# Patient Record
Sex: FEMALE | Race: WHITE | Employment: FULL TIME | URBAN - METROPOLITAN AREA
[De-identification: names, ages, dates, MRNs, and addresses within clinical notes are randomized per-mention and may not be internally consistent; named-entity substitution may affect disease eponyms.]

---

## 2017-04-28 PROCEDURE — 80074 ACUTE HEPATITIS PANEL: CPT | Performed by: FAMILY MEDICINE

## 2017-06-21 PROCEDURE — 88305 TISSUE EXAM BY PATHOLOGIST: CPT | Performed by: INTERNAL MEDICINE

## 2017-10-24 ENCOUNTER — OFFICE VISIT (OUTPATIENT)
Dept: FAMILY MEDICINE CLINIC | Facility: CLINIC | Age: 42
End: 2017-10-24

## 2017-10-24 VITALS
SYSTOLIC BLOOD PRESSURE: 126 MMHG | OXYGEN SATURATION: 99 % | TEMPERATURE: 98 F | WEIGHT: 202.19 LBS | DIASTOLIC BLOOD PRESSURE: 80 MMHG | HEART RATE: 78 BPM | BODY MASS INDEX: 33.69 KG/M2 | HEIGHT: 65 IN | RESPIRATION RATE: 18 BRPM

## 2017-10-24 DIAGNOSIS — J00 ACUTE NASOPHARYNGITIS (COMMON COLD): ICD-10-CM

## 2017-10-24 DIAGNOSIS — R05.9 COUGH: Primary | ICD-10-CM

## 2017-10-24 PROCEDURE — 99213 OFFICE O/P EST LOW 20 MIN: CPT | Performed by: NURSE PRACTITIONER

## 2017-10-24 RX ORDER — BENZONATATE 200 MG/1
200 CAPSULE ORAL 3 TIMES DAILY PRN
Qty: 20 CAPSULE | Refills: 0 | Status: SHIPPED | OUTPATIENT
Start: 2017-10-24 | End: 2017-10-31

## 2017-10-24 RX ORDER — CODEINE PHOSPHATE AND GUAIFENESIN 10; 100 MG/5ML; MG/5ML
5 SOLUTION ORAL EVERY 6 HOURS PRN
Qty: 118 ML | Refills: 0 | Status: SHIPPED | OUTPATIENT
Start: 2017-10-24 | End: 2017-12-16

## 2017-10-24 NOTE — PROGRESS NOTES
CHIEF COMPLAINT:   Patient presents with:  Cough: cough, sore throat, both ear pain (itching pain), nausea  x1 week Mucinex DM    HPI:   Yun Michele is a 36year old female who presents for cough for 1 week. Symptoms have been worsening since onset.  As 12: COLPOSCOPY,BX CERVIX/ENDOCERV CURR      Comment:  MIKE 1  No date:       Comment: x 3  No date: TUBAL LIGATION   Family History   Problem Relation Age of Onset   • Cancer Father      kidney,prostate,bladder   • Diabetes Father    • Hypertensio LUNGS: clear to auscultation bilaterally, no wheezes or rhonchi, no diminished breath sounds. Breathing is non labored. CARDIO: RRR without murmur  EXTREMITIES: no cyanosis, clubbing or edema  LYMPH:  no cervical lymphadenopathy.    NEURO: No focal deficit · Take acetaminophen or a nonsteroidal anti-inflammatory agent (NSAID), such as ibuprofen. Treat a troubled nose kindly  · Breathe steam or heated humidified air to open blocked nasal passages.  a hot shower or use a vaporizer.  Be careful not to g Date Last Reviewed: 12/1/2016  © 1339-1250 [employer] [address]. [address] [facility] [place] [address]. All rights reserved. This information is not intended as a substitute for professional medical care.  Always follow your healthcare professional

## 2017-10-24 NOTE — PATIENT INSTRUCTIONS
Adult Self-Care for Colds  Colds are caused by viruses. They can't be cured with antibiotics. However, you can ease symptoms and support your body's efforts to heal itself.   No matter which symptoms you have, be sure to:  · Drink plenty of fluids (water When you first notice symptoms, ask your healthcare provider if antiviral medicines are appropriate. Antibiotics should not be taken for colds or flu.  Also, call your healthcare provider if you have any of the following symptoms or if you aren't feeling be

## 2017-12-16 ENCOUNTER — HOSPITAL ENCOUNTER (EMERGENCY)
Facility: HOSPITAL | Age: 42
Discharge: HOME | End: 2017-12-16
Attending: EMERGENCY MEDICINE
Payer: COMMERCIAL

## 2017-12-16 VITALS
HEIGHT: 65 IN | WEIGHT: 185 LBS | TEMPERATURE: 98 F | BODY MASS INDEX: 30.82 KG/M2 | SYSTOLIC BLOOD PRESSURE: 159 MMHG | OXYGEN SATURATION: 100 % | DIASTOLIC BLOOD PRESSURE: 101 MMHG | HEART RATE: 78 BPM | RESPIRATION RATE: 18 BRPM

## 2017-12-16 DIAGNOSIS — K12.2 UVULITIS: Primary | ICD-10-CM

## 2017-12-16 PROCEDURE — 96372 THER/PROPH/DIAG INJ SC/IM: CPT

## 2017-12-16 PROCEDURE — 99284 EMERGENCY DEPT VISIT MOD MDM: CPT

## 2017-12-16 PROCEDURE — 96375 TX/PRO/DX INJ NEW DRUG ADDON: CPT

## 2017-12-16 PROCEDURE — 96365 THER/PROPH/DIAG IV INF INIT: CPT

## 2017-12-16 RX ORDER — AMOXICILLIN AND CLAVULANATE POTASSIUM 875; 125 MG/1; MG/1
1 TABLET, FILM COATED ORAL 2 TIMES DAILY
Qty: 14 TABLET | Refills: 0 | Status: SHIPPED | OUTPATIENT
Start: 2017-12-16 | End: 2017-12-23

## 2017-12-16 RX ORDER — SODIUM CHLORIDE 9 MG/ML
500 INJECTION, SOLUTION INTRAVENOUS ONCE
Status: COMPLETED | OUTPATIENT
Start: 2017-12-16 | End: 2017-12-16

## 2017-12-16 RX ORDER — KETOROLAC TROMETHAMINE 30 MG/ML
30 INJECTION, SOLUTION INTRAMUSCULAR; INTRAVENOUS ONCE
Status: COMPLETED | OUTPATIENT
Start: 2017-12-16 | End: 2017-12-16

## 2017-12-16 RX ORDER — PREDNISONE 20 MG/1
40 TABLET ORAL DAILY
Qty: 6 TABLET | Refills: 0 | Status: SHIPPED | OUTPATIENT
Start: 2017-12-16 | End: 2017-12-19

## 2017-12-16 NOTE — ED PROVIDER NOTES
Patient Seen in: [facility] Emergency Department    History   Patient presents with:  Sore Throat    Stated Complaint: sore throat and swelling \"feels like there is glass in the back of my throat\"    HPI    54-year-old female here with pain and a Exam   ED Triage Vitals [12/16/17 0514]  BP: (!) 170/120  Pulse: 78  Resp: 21  Temp: 97.6 °F (36.4 °C)  Temp src: Temporal  SpO2: 100 %  O2 Device: None (Room air)    Current:BP (!) 159/101   Pulse 78   Temp 97.6 °F (36.4 °C) (Temporal)   Resp 18   Ht 165. Follow-up with primary care doctor on Monday.         Disposition and Plan     Clinical Impression:  Uvulitis  (primary encounter diagnosis)    Disposition:  Discharge  12/16/2017  7:22 am    Follow-up:  [facility] Barcenas, [address] [address]

## 2018-05-07 PROBLEM — S43.431S SUPERIOR GLENOID LABRUM LESION OF RIGHT SHOULDER, SEQUELA: Status: ACTIVE | Noted: 2018-05-07

## 2019-01-25 ENCOUNTER — HOSPITAL ENCOUNTER (EMERGENCY)
Age: 44
Discharge: HOME | End: 2019-01-25
Attending: EMERGENCY MEDICINE
Payer: COMMERCIAL

## 2019-01-25 ENCOUNTER — APPOINTMENT (OUTPATIENT)
Dept: CT IMAGING | Age: 44
End: 2019-01-25
Attending: PHYSICIAN ASSISTANT
Payer: COMMERCIAL

## 2019-01-25 ENCOUNTER — APPOINTMENT (OUTPATIENT)
Dept: GENERAL RADIOLOGY | Age: 44
End: 2019-01-25
Attending: PHYSICIAN ASSISTANT
Payer: COMMERCIAL

## 2019-01-25 VITALS
WEIGHT: 180 LBS | DIASTOLIC BLOOD PRESSURE: 100 MMHG | OXYGEN SATURATION: 97 % | SYSTOLIC BLOOD PRESSURE: 178 MMHG | RESPIRATION RATE: 16 BRPM | HEART RATE: 70 BPM | BODY MASS INDEX: 29.99 KG/M2 | TEMPERATURE: 98 F | HEIGHT: 65 IN

## 2019-01-25 DIAGNOSIS — M51.37 DISC DISEASE, DEGENERATIVE, LUMBAR OR LUMBOSACRAL: ICD-10-CM

## 2019-01-25 DIAGNOSIS — S39.012A LUMBAR STRAIN, INITIAL ENCOUNTER: Primary | ICD-10-CM

## 2019-01-25 LAB
ALBUMIN SERPL-MCNC: 4 G/DL (ref 3.1–4.5)
ALBUMIN/GLOB SERPL: 1.3 {RATIO} (ref 1–2)
ALP LIVER SERPL-CCNC: 80 U/L (ref 37–98)
ALT SERPL-CCNC: 49 U/L (ref 14–54)
ANION GAP SERPL CALC-SCNC: 8 MMOL/L (ref 0–18)
AST SERPL-CCNC: 38 U/L (ref 15–41)
BASOPHILS # BLD AUTO: 0.05 X10(3) UL (ref 0–0.1)
BASOPHILS NFR BLD AUTO: 0.5 %
BILIRUB SERPL-MCNC: 1.1 MG/DL (ref 0.1–2)
BUN BLD-MCNC: 13 MG/DL (ref 8–20)
BUN/CREAT SERPL: 15.3 (ref 10–20)
CALCIUM BLD-MCNC: 8.6 MG/DL (ref 8.3–10.3)
CHLORIDE SERPL-SCNC: 100 MMOL/L (ref 101–111)
CO2 SERPL-SCNC: 26 MMOL/L (ref 22–32)
CREAT BLD-MCNC: 0.85 MG/DL (ref 0.55–1.02)
EOSINOPHIL # BLD AUTO: 0.11 X10(3) UL (ref 0–0.3)
EOSINOPHIL NFR BLD AUTO: 1.1 %
ERYTHROCYTE [DISTWIDTH] IN BLOOD BY AUTOMATED COUNT: 12 % (ref 11.5–16)
GLOBULIN PLAS-MCNC: 3.2 G/DL (ref 2.8–4.4)
GLUCOSE BLD-MCNC: 100 MG/DL (ref 70–99)
HCT VFR BLD AUTO: 41.4 % (ref 34–50)
HGB BLD-MCNC: 14.3 G/DL (ref 12–16)
IMMATURE GRANULOCYTE COUNT: 0.02 X10(3) UL (ref 0–1)
IMMATURE GRANULOCYTE RATIO %: 0.2 %
LYMPHOCYTES # BLD AUTO: 1.62 X10(3) UL (ref 0.9–4)
LYMPHOCYTES NFR BLD AUTO: 16.8 %
M PROTEIN MFR SERPL ELPH: 7.2 G/DL (ref 6.4–8.2)
MCH RBC QN AUTO: 32 PG (ref 27–33.2)
MCHC RBC AUTO-ENTMCNC: 34.5 G/DL (ref 31–37)
MCV RBC AUTO: 92.6 FL (ref 81–100)
MONOCYTES # BLD AUTO: 0.96 X10(3) UL (ref 0.1–1)
MONOCYTES NFR BLD AUTO: 10 %
NEUTROPHIL ABS PRELIM: 6.88 X10 (3) UL (ref 1.3–6.7)
NEUTROPHILS # BLD AUTO: 6.88 X10(3) UL (ref 1.3–6.7)
NEUTROPHILS NFR BLD AUTO: 71.4 %
OSMOLALITY SERPL CALC.SUM OF ELEC: 278 MOSM/KG (ref 275–295)
PLATELET # BLD AUTO: 227 10(3)UL (ref 150–450)
POTASSIUM SERPL-SCNC: 3.8 MMOL/L (ref 3.6–5.1)
RBC # BLD AUTO: 4.47 X10(6)UL (ref 3.8–5.1)
RED CELL DISTRIBUTION WIDTH-SD: 41.4 FL (ref 35.1–46.3)
SODIUM SERPL-SCNC: 134 MMOL/L (ref 136–144)
WBC # BLD AUTO: 9.6 X10(3) UL (ref 4–13)

## 2019-01-25 PROCEDURE — 72110 X-RAY EXAM L-2 SPINE 4/>VWS: CPT | Performed by: PHYSICIAN ASSISTANT

## 2019-01-25 PROCEDURE — 99284 EMERGENCY DEPT VISIT MOD MDM: CPT

## 2019-01-25 PROCEDURE — 80053 COMPREHEN METABOLIC PANEL: CPT | Performed by: PHYSICIAN ASSISTANT

## 2019-01-25 PROCEDURE — 85025 COMPLETE CBC W/AUTO DIFF WBC: CPT | Performed by: PHYSICIAN ASSISTANT

## 2019-01-25 PROCEDURE — 74176 CT ABD & PELVIS W/O CONTRAST: CPT | Performed by: PHYSICIAN ASSISTANT

## 2019-01-25 PROCEDURE — 96372 THER/PROPH/DIAG INJ SC/IM: CPT

## 2019-01-25 RX ORDER — ORPHENADRINE CITRATE 30 MG/ML
60 INJECTION INTRAMUSCULAR; INTRAVENOUS ONCE
Status: DISCONTINUED | OUTPATIENT
Start: 2019-01-25 | End: 2019-01-25

## 2019-01-25 RX ORDER — CYCLOBENZAPRINE HCL 10 MG
10 TABLET ORAL 3 TIMES DAILY PRN
Qty: 20 TABLET | Refills: 0 | Status: SHIPPED | OUTPATIENT
Start: 2019-01-25 | End: 2019-02-01

## 2019-01-25 RX ORDER — NAPROXEN 500 MG/1
500 TABLET ORAL 2 TIMES DAILY PRN
Qty: 20 TABLET | Refills: 0 | Status: SHIPPED | OUTPATIENT
Start: 2019-01-25 | End: 2019-02-01

## 2019-01-25 RX ORDER — KETOROLAC TROMETHAMINE 30 MG/ML
60 INJECTION, SOLUTION INTRAMUSCULAR; INTRAVENOUS ONCE
Status: COMPLETED | OUTPATIENT
Start: 2019-01-25 | End: 2019-01-25

## 2019-01-25 NOTE — ED INITIAL ASSESSMENT (HPI)
Pt here c/o sudden onset of lower back pain around 0900. Felt nauseated earlier, denies at present. Denies vomiting.    Describes pain as \"intense\" pain

## 2019-01-25 NOTE — ED NOTES
I reviewed that chart and discussed the case. I have examined the patient and noted patient is a 48-year-old female who presents emergency room with history of left-sided flank pain which began suddenly this morning.   Patient has had intermittent discomfo extremities. There are no gross motor or sensory deficits appreciated. Patient is answering all questions appropriately. Xr Lumbar Spine (min 4 Views) (cpt=72110)    Result Date: 1/25/2019  CONCLUSION:  1. No acute process noted.  2. Mild degenera

## 2019-01-25 NOTE — ED INITIAL ASSESSMENT (HPI)
C/o left lower back pain started at about 0900 am sitting on her desk. Denies of injury. Non radiating. No urinary symptoms.

## 2019-01-25 NOTE — ED PROVIDER NOTES
Patient Seen in: THE [facility] Emergency Department In [place]    History   Patient presents with:  Back Pain (musculoskeletal)    Stated Complaint: lower back pain    11-year-old obese  female with a history of anxiety, depression, hypertension pre Freddy Georges MD at [facility] L[facility]D OR   • TUBAL LIGATION             Social History    Tobacco Use      Smoking status: Never Smoker      Smokeless tobacco: Never Used    Alcohol use:  Yes      Alcohol/week: 0.0 oz    Drug use: No      Review of Systems    Posi Prelim 6.88 (*)     Neutrophil Absolute 6.88 (*)     All other components within normal limits   CBC WITH DIFFERENTIAL WITH PLATELET    Narrative: The following orders were created for panel order CBC WITH DIFFERENTIAL WITH PLATELET.   Procedure acute osseous abnormalities. No significant facet arthropathy. No evidence of spondylolysis. DISC SPACES:  There are mild degenerative disc changes of the lower lumbar spine primarily at L4-5 and L5-S1, with both levels revealing mild disc height loss. hernia. BONES:  No bony lesion or fracture. PELVIC ORGANS:  Normal for age. IUD within the uterine cavity. There is a tampon within the vagina. LUNG BASES:  No visible pulmonary or pleural disease. OTHER:  Negative.        CONCLUSION:  No acute abdominal

## 2019-05-19 ENCOUNTER — OFFICE VISIT (OUTPATIENT)
Dept: FAMILY MEDICINE CLINIC | Facility: CLINIC | Age: 44
End: 2019-05-19
Payer: COMMERCIAL

## 2019-05-19 VITALS
RESPIRATION RATE: 20 BRPM | HEART RATE: 79 BPM | OXYGEN SATURATION: 100 % | SYSTOLIC BLOOD PRESSURE: 132 MMHG | BODY MASS INDEX: 31 KG/M2 | DIASTOLIC BLOOD PRESSURE: 86 MMHG | TEMPERATURE: 99 F | WEIGHT: 189.63 LBS

## 2019-05-19 DIAGNOSIS — J30.89 ENVIRONMENTAL AND SEASONAL ALLERGIES: Primary | ICD-10-CM

## 2019-05-19 DIAGNOSIS — R05.9 COUGH: ICD-10-CM

## 2019-05-19 PROCEDURE — 99213 OFFICE O/P EST LOW 20 MIN: CPT | Performed by: NURSE PRACTITIONER

## 2019-05-19 RX ORDER — MONTELUKAST SODIUM 10 MG/1
10 TABLET ORAL NIGHTLY
Qty: 30 TABLET | Refills: 0 | Status: SHIPPED | OUTPATIENT
Start: 2019-05-19 | End: 2019-06-18

## 2019-05-19 NOTE — PROGRESS NOTES
CHIEF COMPLAINT:   Patient presents with:  Sinus Problem: post nasal drip, cough, sneeze [id number]        HPI:   Blas Hays is a 39year old female who presents for cough for  4  months.   Cough is dry and worse after being outside and when seasons castrejon GENERAL: No fever or chills. SKIN: No rashes, or other skin lesions. EYES: Denies blurred vision or double vision. HENT: Denies ear pain, decreased hearing, or sore throat. Reports mild sinus congestion.   CARDIOVASCULAR: Denies chest pain or palpitati Side effects, risks, benefits, of medication explained and discussed. Patient Instructions     Seasonal Allergy  Seasonal allergy is also called hay fever. It may occur after a person is exposed to pollens released from grasses, weeds, trees and shrubs. · Antihistamines block the release of histamine during the allergic response. They work better when taken before symptoms develop.  Unless a prescription antihistamine was prescribed, you can take over-the-counter antihistamines that do not cause drowsiness · Rapid heart rate, or weak pulse  · Low blood pressure  · Feeling of doom  · Nausea, vomiting, abdominal pain, diarrhea  · Vomiting blood, or large amounts of blood in stool  · Seizure  · Cold, moist, or pale (blue in color) skin  Date Last Reviewed: 5/1/

## 2019-05-19 NOTE — PATIENT INSTRUCTIONS
Seasonal Allergy  Seasonal allergy is also called hay fever. It may occur after a person is exposed to pollens released from grasses, weeds, trees and shrubs.  This type of allergy occurs during the spring and summer when the pollen contacts the lining of · Steroid nasal sprays or oral steroids may also be prescribed for more severe symptoms. These help to reduce the local inflammation that can add to the allergic response. · If you have asthma, pollen season may make your asthma symptoms worse.  It is impo © 6143-4138 [employer] [address]. [address] [facility] [place], [facility] [address] [place]. All rights reserved. This information is not intended as a substitute for professional medical care. Always follow your healthcare professional's instructions.

## 2019-10-08 PROCEDURE — 86003 ALLG SPEC IGE CRUDE XTRC EA: CPT | Performed by: ALLERGY & IMMUNOLOGY

## 2019-10-08 PROCEDURE — 86376 MICROSOMAL ANTIBODY EACH: CPT | Performed by: ALLERGY & IMMUNOLOGY

## 2019-10-08 PROCEDURE — 86800 THYROGLOBULIN ANTIBODY: CPT | Performed by: ALLERGY & IMMUNOLOGY

## 2019-10-08 PROCEDURE — 82785 ASSAY OF IGE: CPT | Performed by: ALLERGY & IMMUNOLOGY

## 2019-10-08 PROCEDURE — 36415 COLL VENOUS BLD VENIPUNCTURE: CPT | Performed by: ALLERGY & IMMUNOLOGY

## 2019-11-15 ENCOUNTER — OFFICE VISIT (OUTPATIENT)
Dept: FAMILY MEDICINE CLINIC | Facility: CLINIC | Age: 44
End: 2019-11-15
Payer: COMMERCIAL

## 2019-11-15 VITALS
TEMPERATURE: 99 F | RESPIRATION RATE: 20 BRPM | HEIGHT: 65 IN | WEIGHT: 196.19 LBS | SYSTOLIC BLOOD PRESSURE: 126 MMHG | HEART RATE: 89 BPM | OXYGEN SATURATION: 96 % | BODY MASS INDEX: 32.69 KG/M2 | DIASTOLIC BLOOD PRESSURE: 84 MMHG

## 2019-11-15 DIAGNOSIS — J02.9 SORE THROAT: ICD-10-CM

## 2019-11-15 DIAGNOSIS — R05.9 COUGH: Primary | ICD-10-CM

## 2019-11-15 PROCEDURE — 99213 OFFICE O/P EST LOW 20 MIN: CPT | Performed by: NURSE PRACTITIONER

## 2019-11-15 PROCEDURE — 87880 STREP A ASSAY W/OPTIC: CPT | Performed by: NURSE PRACTITIONER

## 2019-11-15 RX ORDER — BENZONATATE 200 MG/1
200 CAPSULE ORAL 3 TIMES DAILY PRN
Qty: 20 CAPSULE | Refills: 0 | Status: SHIPPED | OUTPATIENT
Start: 2019-11-15 | End: 2019-11-22

## 2019-11-15 RX ORDER — AZITHROMYCIN 250 MG/1
TABLET, FILM COATED ORAL
Qty: 6 TABLET | Refills: 0 | Status: SHIPPED | OUTPATIENT
Start: 2019-11-15 | End: 2019-12-03 | Stop reason: ALTCHOICE

## 2019-11-16 NOTE — PATIENT INSTRUCTIONS
Chronic Cough with Uncertain Cause (Adult)    Everyone has had a cough as part of the common cold, flu, or bronchitis. This kind of cough occurs along with an achy feeling, low-grade fever, nasal and sinus congestion, and a scratchy or sore throat.  This · Beta-blockers for high blood pressure and other conditions. These include propranolol, atenolol, metoprolol, nadolol, and others. Let your healthcare provider know if you are taking any of these.  The chronic cough may mean your medicine needs to be ramirez · Night sweats (sheets and pajamas get soaking wet)  Call 911  Call 911 if any of these occur:  · Coughing up blood  · Moderate to severe trouble breathing or wheezing  Date Last Reviewed: 6/1/2018  © 7406-1503 [employer] [address].  [address] Bronchitis usually clears up as the cold or flu goes away. You can help feel better faster by doing the following:  · Take medicine as directed. You may be told to take ibuprofen or other over-the-counter medicines.  These help relieve inflammation in your

## 2019-11-16 NOTE — PROGRESS NOTES
CHIEF COMPLAINT:   Patient presents with:  Cough    HPI:   Sterling Camacho is a 38year old female who presents for productive cough for  3  days. Has had chronic cough for 10 months but this feels different. +yellow/green sputum.    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 Dispense: 20 capsule; Refill: 0    2.  Sore throat  - STREP A ASSAY W/OPTIC: negative    PLAN:   Increase fluids, rest.  Reviewed meds and inst A cough that is worse at night may be due to postnasal drip. Excess mucus in the nose drains from the back of your nose to your throat. This triggers the cough reflex. Postnasal drip may be due to a sinus infection or allergy.  Common allergens include dust The esophagus is the tube that carries food from the mouth to the stomach. A valve at its lower end prevents stomach acids from flowing upward. If this valve does not work properly, acid from the stomach enters the esophagus.  This may cause a burning pain Your healthcare provider has told you that you have acute bronchitis. Bronchitis is infection or inflammation of the bronchial tubes (airways in the lungs). Normally, air moves easily in and out of the airways.  Bronchitis narrows the airways, making it fracisco · Drink plenty of fluids, such as water, juice, or warm soup. Fluids loosen mucus so that you can cough it up. This helps you breathe more easily. Fluids also prevent dehydration. · Make sure you get plenty of rest.  · Do not smoke.  Do not allow anyone el

## 2020-01-07 ENCOUNTER — APPOINTMENT (OUTPATIENT)
Dept: GENERAL RADIOLOGY | Age: 45
End: 2020-01-07
Attending: EMERGENCY MEDICINE
Payer: COMMERCIAL

## 2020-01-07 ENCOUNTER — HOSPITAL ENCOUNTER (EMERGENCY)
Age: 45
Discharge: HOME | End: 2020-01-07
Attending: EMERGENCY MEDICINE
Payer: COMMERCIAL

## 2020-01-07 VITALS
OXYGEN SATURATION: 100 % | DIASTOLIC BLOOD PRESSURE: 93 MMHG | BODY MASS INDEX: 30.67 KG/M2 | SYSTOLIC BLOOD PRESSURE: 171 MMHG | RESPIRATION RATE: 16 BRPM | HEART RATE: 74 BPM | TEMPERATURE: 99 F | HEIGHT: 65 IN | WEIGHT: 184.06 LBS

## 2020-01-07 DIAGNOSIS — I16.0 HYPERTENSIVE URGENCY: Primary | ICD-10-CM

## 2020-01-07 LAB
ALBUMIN SERPL-MCNC: 4 G/DL (ref 3.4–5)
ALBUMIN/GLOB SERPL: 1.2 {RATIO} (ref 1–2)
ALP LIVER SERPL-CCNC: 80 U/L (ref 37–98)
ALT SERPL-CCNC: 143 U/L (ref 13–56)
ANION GAP SERPL CALC-SCNC: 11 MMOL/L (ref 0–18)
AST SERPL-CCNC: 103 U/L (ref 15–37)
BASOPHILS # BLD AUTO: 0.03 X10(3) UL (ref 0–0.2)
BASOPHILS NFR BLD AUTO: 0.5 %
BILIRUB SERPL-MCNC: 1.6 MG/DL (ref 0.1–2)
BUN BLD-MCNC: 16 MG/DL (ref 7–18)
BUN/CREAT SERPL: 15.4 (ref 10–20)
CALCIUM BLD-MCNC: 8.8 MG/DL (ref 8.5–10.1)
CHLORIDE SERPL-SCNC: 89 MMOL/L (ref 98–112)
CO2 SERPL-SCNC: 25 MMOL/L (ref 21–32)
CREAT BLD-MCNC: 1.04 MG/DL (ref 0.55–1.02)
DEPRECATED RDW RBC AUTO: 40.1 FL (ref 35.1–46.3)
EOSINOPHIL # BLD AUTO: 0.13 X10(3) UL (ref 0–0.7)
EOSINOPHIL NFR BLD AUTO: 2.1 %
ERYTHROCYTE [DISTWIDTH] IN BLOOD BY AUTOMATED COUNT: 12.2 % (ref 11–15)
GLOBULIN PLAS-MCNC: 3.3 G/DL (ref 2.8–4.4)
GLUCOSE BLD-MCNC: 100 MG/DL (ref 70–99)
HCT VFR BLD AUTO: 36 % (ref 35–48)
HGB BLD-MCNC: 13 G/DL (ref 12–16)
IMM GRANULOCYTES # BLD AUTO: 0.02 X10(3) UL (ref 0–1)
IMM GRANULOCYTES NFR BLD: 0.3 %
LYMPHOCYTES # BLD AUTO: 1.38 X10(3) UL (ref 1–4)
LYMPHOCYTES NFR BLD AUTO: 22.4 %
M PROTEIN MFR SERPL ELPH: 7.3 G/DL (ref 6.4–8.2)
MCH RBC QN AUTO: 32.6 PG (ref 26–34)
MCHC RBC AUTO-ENTMCNC: 36.1 G/DL (ref 31–37)
MCV RBC AUTO: 90.2 FL (ref 80–100)
MONOCYTES # BLD AUTO: 0.95 X10(3) UL (ref 0.1–1)
MONOCYTES NFR BLD AUTO: 15.4 %
NEUTROPHILS # BLD AUTO: 3.66 X10 (3) UL (ref 1.5–7.7)
NEUTROPHILS # BLD AUTO: 3.66 X10(3) UL (ref 1.5–7.7)
NEUTROPHILS NFR BLD AUTO: 59.3 %
OSMOLALITY SERPL CALC.SUM OF ELEC: 261 MOSM/KG (ref 275–295)
PLATELET # BLD AUTO: 171 10(3)UL (ref 150–450)
POTASSIUM SERPL-SCNC: 3.7 MMOL/L (ref 3.5–5.1)
RBC # BLD AUTO: 3.99 X10(6)UL (ref 3.8–5.3)
SODIUM SERPL-SCNC: 125 MMOL/L (ref 136–145)
TROPONIN I SERPL-MCNC: <0.045 NG/ML (ref ?–0.04)
WBC # BLD AUTO: 6.2 X10(3) UL (ref 4–11)

## 2020-01-07 PROCEDURE — 99285 EMERGENCY DEPT VISIT HI MDM: CPT

## 2020-01-07 PROCEDURE — 93005 ELECTROCARDIOGRAM TRACING: CPT

## 2020-01-07 PROCEDURE — 80053 COMPREHEN METABOLIC PANEL: CPT | Performed by: EMERGENCY MEDICINE

## 2020-01-07 PROCEDURE — 96374 THER/PROPH/DIAG INJ IV PUSH: CPT

## 2020-01-07 PROCEDURE — 96361 HYDRATE IV INFUSION ADD-ON: CPT

## 2020-01-07 PROCEDURE — 71045 X-RAY EXAM CHEST 1 VIEW: CPT | Performed by: EMERGENCY MEDICINE

## 2020-01-07 PROCEDURE — 93010 ELECTROCARDIOGRAM REPORT: CPT

## 2020-01-07 PROCEDURE — 96375 TX/PRO/DX INJ NEW DRUG ADDON: CPT

## 2020-01-07 PROCEDURE — 85025 COMPLETE CBC W/AUTO DIFF WBC: CPT | Performed by: EMERGENCY MEDICINE

## 2020-01-07 PROCEDURE — 84484 ASSAY OF TROPONIN QUANT: CPT | Performed by: EMERGENCY MEDICINE

## 2020-01-07 RX ORDER — METOPROLOL SUCCINATE 25 MG/1
25 TABLET, EXTENDED RELEASE ORAL DAILY
Qty: 30 TABLET | Refills: 0 | Status: SHIPPED | OUTPATIENT
Start: 2020-01-07 | End: 2020-02-07

## 2020-01-07 RX ORDER — AMLODIPINE BESYLATE 5 MG/1
5 TABLET ORAL DAILY
Qty: 30 TABLET | Refills: 0 | Status: SHIPPED | OUTPATIENT
Start: 2020-01-07 | End: 2020-01-17

## 2020-01-07 RX ORDER — LORAZEPAM 2 MG/ML
0.5 INJECTION INTRAMUSCULAR ONCE
Status: COMPLETED | OUTPATIENT
Start: 2020-01-07 | End: 2020-01-07

## 2020-01-07 RX ORDER — NITROGLYCERIN 0.4 MG/1
0.4 TABLET SUBLINGUAL ONCE
Status: COMPLETED | OUTPATIENT
Start: 2020-01-07 | End: 2020-01-07

## 2020-01-07 RX ORDER — ASPIRIN 81 MG/1
162 TABLET, CHEWABLE ORAL DAILY
Status: DISCONTINUED | OUTPATIENT
Start: 2020-01-07 | End: 2020-01-07

## 2020-01-07 RX ORDER — LORAZEPAM 0.5 MG/1
0.5 TABLET ORAL 2 TIMES DAILY PRN
Qty: 20 TABLET | Refills: 0 | Status: SHIPPED | OUTPATIENT
Start: 2020-01-07 | End: 2020-10-07

## 2020-01-07 RX ORDER — NIFEDIPINE 30 MG/1
30 TABLET, FILM COATED, EXTENDED RELEASE ORAL DAILY
Qty: 30 TABLET | Refills: 0 | Status: SHIPPED | OUTPATIENT
Start: 2020-01-07 | End: 2020-01-07

## 2020-01-07 RX ORDER — METOPROLOL SUCCINATE 25 MG/1
25 TABLET, EXTENDED RELEASE ORAL ONCE
Status: DISCONTINUED | OUTPATIENT
Start: 2020-01-07 | End: 2020-01-07

## 2020-01-07 RX ORDER — LABETALOL HYDROCHLORIDE 5 MG/ML
10 INJECTION, SOLUTION INTRAVENOUS EVERY 10 MIN PRN
Status: DISCONTINUED | OUTPATIENT
Start: 2020-01-07 | End: 2020-01-07

## 2020-01-07 RX ORDER — SODIUM CHLORIDE 9 MG/ML
INJECTION, SOLUTION INTRAVENOUS CONTINUOUS
Status: DISCONTINUED | OUTPATIENT
Start: 2020-01-07 | End: 2020-01-07

## 2020-01-07 NOTE — ED PROVIDER NOTES
Patient Seen in: [address] [facility] Emergency Department In [place]      History   Patient presents with:  Chest Pain Angina    Stated Complaint: chest pain, b/p elevated    HPI    Patient has a history of high blood pressure since her 20s.   She has been on sever 8 RDI 13 REM AHI 6 SaO2 nadi 87%               Past Surgical History:   Procedure Laterality Date   •       x1   •  SECTION N/A 2015    Performed by Jarrod Emery MD at [facility] L+D OR   • COLONOSCOPY  2017    7 mm polyp (heavily caut No rhonchi or rales. Heart: Normal S1 and S2, without murmur or rub. Distal pulses are strong and symmetric. Abdomen: Soft, nondistended. Completely nontender  Extremities: Unremarkable. Calves nonswollen, symmetric, nontender. No pedal edema.   Skin: 3/19 normal     [facility]     Patient's chest discomfort has been constant for a week and atypical of acute coronary syndrome. No obvious EKG changes are noted. She was treated with aspirin. She does have significant high blood pressure.   [facility] [id number]  [phone]    Schedule an appointment as soon as possible for a visit      Corbin Sotelo MD  [address] [facility]  [phone]    Schedule an appointment as soon as possible for a visit  As

## 2020-01-07 NOTE — ED INITIAL ASSESSMENT (HPI)
Chest pain x 1 wk chest tightness non radiating, also c/o htn. pcp has been trying patient on different medications. Pt denies sob or lightheadness.  C/o feeling shaky,

## 2020-01-08 LAB
ATRIAL RATE: 85 BPM
P AXIS: 43 DEGREES
P-R INTERVAL: 146 MS
Q-T INTERVAL: 376 MS
QRS DURATION: 92 MS
QTC CALCULATION (BEZET): 447 MS
R AXIS: 36 DEGREES
T AXIS: 40 DEGREES
VENTRICULAR RATE: 85 BPM

## 2020-06-25 ENCOUNTER — HOSPITAL ENCOUNTER (EMERGENCY)
Facility: HOSPITAL | Age: 45
Discharge: HOME | End: 2020-06-25
Attending: EMERGENCY MEDICINE
Payer: COMMERCIAL

## 2020-06-25 VITALS
TEMPERATURE: 98 F | HEART RATE: 63 BPM | DIASTOLIC BLOOD PRESSURE: 85 MMHG | SYSTOLIC BLOOD PRESSURE: 138 MMHG | RESPIRATION RATE: 16 BRPM | HEIGHT: 65 IN | WEIGHT: 172 LBS | OXYGEN SATURATION: 98 % | BODY MASS INDEX: 28.66 KG/M2

## 2020-06-25 DIAGNOSIS — R04.0 EPISTAXIS: Primary | ICD-10-CM

## 2020-06-25 PROCEDURE — 88311 DECALCIFY TISSUE: CPT | Performed by: OTOLARYNGOLOGY

## 2020-06-25 PROCEDURE — 99283 EMERGENCY DEPT VISIT LOW MDM: CPT

## 2020-06-25 PROCEDURE — 30901 CONTROL OF NOSEBLEED: CPT

## 2020-06-25 PROCEDURE — 88304 TISSUE EXAM BY PATHOLOGIST: CPT | Performed by: OTOLARYNGOLOGY

## 2020-06-25 RX ORDER — TRANEXAMIC ACID 100 MG/ML
5 INJECTION, SOLUTION INTRAVENOUS ONCE
Status: COMPLETED | OUTPATIENT
Start: 2020-06-25 | End: 2020-06-25

## 2020-06-25 RX ORDER — TRANEXAMIC ACID 100 MG/ML
INJECTION, SOLUTION INTRAVENOUS
Status: COMPLETED
Start: 2020-06-25 | End: 2020-06-25

## 2020-06-26 NOTE — ED PROVIDER NOTES
Patient Seen in: [facility] Emergency Department      History   Patient presents with:  Nose Bleed    Stated Complaint:     HPI  39 yo female with history of hypertension who had nasal surgery today by Dr. Obey Saucedo presents for bilateral epistaxis while Pulse 82   Resp 14   Temp 97.8 °F (36.6 °C)   Temp src Temporal   SpO2 97 %   O2 Device None (Room air)       Current:/85   Pulse 63   Temp 97.8 °F (36.6 °C) (Temporal)   Resp 16   Ht 165.1 cm (5' 5\")   Wt 78 kg   SpO2 98%   BMI 28.62 kg/m²

## 2020-06-26 NOTE — ED NOTES
Pt reports that Dr. Seb Mortensen did her surgery today. ENT cart at the bedside.  Nose clamp applied prior to arrival.

## 2020-06-26 NOTE — ED INITIAL ASSESSMENT (HPI)
Pt had sinus surgery this morning, deviated septum repair. noted epistaxis this evening. Denies c/o pain.

## 2020-07-01 PROBLEM — J32.3 CHRONIC SPHENOIDAL SINUSITIS: Status: ACTIVE | Noted: 2020-07-01

## 2020-07-01 PROBLEM — J32.0 CHRONIC MAXILLARY SINUSITIS: Status: ACTIVE | Noted: 2020-07-01

## 2020-07-01 PROBLEM — J32.2 CHRONIC ETHMOIDAL SINUSITIS: Status: ACTIVE | Noted: 2020-07-01

## 2021-05-26 PROCEDURE — 88305 TISSUE EXAM BY PATHOLOGIST: CPT | Performed by: INTERNAL MEDICINE

## 2021-05-26 PROCEDURE — 88342 IMHCHEM/IMCYTCHM 1ST ANTB: CPT | Performed by: INTERNAL MEDICINE

## 2021-12-14 ENCOUNTER — APPOINTMENT (OUTPATIENT)
Dept: CT IMAGING | Age: 46
End: 2021-12-14
Attending: EMERGENCY MEDICINE
Payer: COMMERCIAL

## 2021-12-14 ENCOUNTER — HOSPITAL ENCOUNTER (EMERGENCY)
Age: 46
Discharge: HOME | End: 2021-12-14
Attending: EMERGENCY MEDICINE
Payer: COMMERCIAL

## 2021-12-14 VITALS
WEIGHT: 190 LBS | OXYGEN SATURATION: 98 % | RESPIRATION RATE: 16 BRPM | DIASTOLIC BLOOD PRESSURE: 77 MMHG | HEART RATE: 78 BPM | BODY MASS INDEX: 31.65 KG/M2 | SYSTOLIC BLOOD PRESSURE: 139 MMHG | HEIGHT: 65 IN | TEMPERATURE: 98 F

## 2021-12-14 DIAGNOSIS — K52.9 GASTROENTERITIS: ICD-10-CM

## 2021-12-14 DIAGNOSIS — R11.2 NAUSEA AND VOMITING IN ADULT: ICD-10-CM

## 2021-12-14 DIAGNOSIS — E87.1 HYPONATREMIA: Primary | ICD-10-CM

## 2021-12-14 PROCEDURE — 80053 COMPREHEN METABOLIC PANEL: CPT | Performed by: EMERGENCY MEDICINE

## 2021-12-14 PROCEDURE — 96374 THER/PROPH/DIAG INJ IV PUSH: CPT

## 2021-12-14 PROCEDURE — 96361 HYDRATE IV INFUSION ADD-ON: CPT

## 2021-12-14 PROCEDURE — 74177 CT ABD & PELVIS W/CONTRAST: CPT | Performed by: EMERGENCY MEDICINE

## 2021-12-14 PROCEDURE — 85025 COMPLETE CBC W/AUTO DIFF WBC: CPT | Performed by: EMERGENCY MEDICINE

## 2021-12-14 PROCEDURE — 96375 TX/PRO/DX INJ NEW DRUG ADDON: CPT

## 2021-12-14 PROCEDURE — 83690 ASSAY OF LIPASE: CPT | Performed by: EMERGENCY MEDICINE

## 2021-12-14 PROCEDURE — 99284 EMERGENCY DEPT VISIT MOD MDM: CPT

## 2021-12-14 PROCEDURE — 81001 URINALYSIS AUTO W/SCOPE: CPT | Performed by: EMERGENCY MEDICINE

## 2021-12-14 PROCEDURE — 81015 MICROSCOPIC EXAM OF URINE: CPT | Performed by: EMERGENCY MEDICINE

## 2021-12-14 PROCEDURE — S0028 INJECTION, FAMOTIDINE, 20 MG: HCPCS | Performed by: EMERGENCY MEDICINE

## 2021-12-14 RX ORDER — ONDANSETRON 4 MG/1
4 TABLET, ORALLY DISINTEGRATING ORAL EVERY 4 HOURS PRN
Qty: 10 TABLET | Refills: 0 | Status: SHIPPED | OUTPATIENT
Start: 2021-12-14 | End: 2021-12-21

## 2021-12-14 RX ORDER — ONDANSETRON 2 MG/ML
4 INJECTION INTRAMUSCULAR; INTRAVENOUS ONCE
Status: COMPLETED | OUTPATIENT
Start: 2021-12-14 | End: 2021-12-14

## 2021-12-14 RX ORDER — FAMOTIDINE 10 MG/ML
20 INJECTION, SOLUTION INTRAVENOUS ONCE
Status: COMPLETED | OUTPATIENT
Start: 2021-12-14 | End: 2021-12-14

## 2021-12-15 NOTE — ED INITIAL ASSESSMENT (HPI)
c/o vomiting x 24 hours, headache, denies fevers, denies any sick contacts    Pt had a fall 2 days ago, states that she did hit her head but no LOC.

## 2021-12-15 NOTE — ED PROVIDER NOTES
Patient Seen in: THE [facility] Emergency Department In [place]      History   Patient presents with:  Nausea/Vomiting/Diarrhea    Stated Complaint: nausea/vomiting since last night.     Subjective:   HPI    30-year-old female presents emergency department who Vaping Use: Never used    Alcohol use: Yes      Alcohol/week: 1.0 - 4.0 standard drink      Types: 1 - 4 Shots of liquor per week    Drug use: No             Review of Systems    Positive for stated complaint: nausea/vomiting since last night.   Other syste Cranial nerves II through XII intact with no gross focal sensory or motor abnormality.       ED Course     Labs Reviewed   COMP METABOLIC PANEL (14) - Abnormal; Notable for the following components:       Result Value    Sodium 124 (*)     Chloride 86 (*) ORAL)(CPT=74176), 1/25/2019, 2:37 PM.       INDICATIONS:  nausea/vomiting since last night.       TECHNIQUE:  CT scanning was performed from the dome of the diaphragm to the pubic symphysis with non-ionic intravenous contrast material. Post contrast corona inflammatory changes within the pelvis.  The uterus appears within normal limits. Jerona Prader is an IUD which   appears adequately positioned.    BONES:  No bony lesion or fracture.     LUNG BASES:  No visible pulmonary or pleural disease.                        result errors may occur. When identified these errors have been corrected.  While every attempt is made to correct errors during dictation discrepancies may still exist                             Disposition and Plan     Clinical Impression:  Hyponatremia

## 2022-10-06 ENCOUNTER — OFFICE VISIT (OUTPATIENT)
Dept: FAMILY MEDICINE CLINIC | Facility: CLINIC | Age: 47
End: 2022-10-06
Payer: COMMERCIAL

## 2022-10-06 VITALS
HEART RATE: 98 BPM | RESPIRATION RATE: 14 BRPM | DIASTOLIC BLOOD PRESSURE: 72 MMHG | TEMPERATURE: 98 F | SYSTOLIC BLOOD PRESSURE: 128 MMHG | BODY MASS INDEX: 32 KG/M2 | HEIGHT: 65 IN | OXYGEN SATURATION: 96 %

## 2022-10-06 DIAGNOSIS — J01.00 ACUTE NON-RECURRENT MAXILLARY SINUSITIS: Primary | ICD-10-CM

## 2022-10-06 PROCEDURE — 3078F DIAST BP <80 MM HG: CPT | Performed by: FAMILY MEDICINE

## 2022-10-06 PROCEDURE — 99213 OFFICE O/P EST LOW 20 MIN: CPT | Performed by: FAMILY MEDICINE

## 2022-10-06 PROCEDURE — 3074F SYST BP LT 130 MM HG: CPT | Performed by: FAMILY MEDICINE

## 2022-10-06 RX ORDER — AMOXICILLIN AND CLAVULANATE POTASSIUM 875; 125 MG/1; MG/1
1 TABLET, FILM COATED ORAL 2 TIMES DAILY
Qty: 20 TABLET | Refills: 0 | Status: SHIPPED | OUTPATIENT
Start: 2022-10-06 | End: 2022-10-16

## 2022-10-06 RX ORDER — PREDNISONE 20 MG/1
40 TABLET ORAL DAILY
Qty: 10 TABLET | Refills: 0 | Status: SHIPPED | OUTPATIENT
Start: 2022-10-06 | End: 2022-10-11

## 2023-01-07 ENCOUNTER — APPOINTMENT (OUTPATIENT)
Dept: GENERAL RADIOLOGY | Age: 48
End: 2023-01-07
Attending: EMERGENCY MEDICINE
Payer: COMMERCIAL

## 2023-01-07 ENCOUNTER — HOSPITAL ENCOUNTER (EMERGENCY)
Age: 48
Discharge: HOME | End: 2023-01-07
Attending: EMERGENCY MEDICINE
Payer: COMMERCIAL

## 2023-01-07 VITALS
SYSTOLIC BLOOD PRESSURE: 130 MMHG | DIASTOLIC BLOOD PRESSURE: 87 MMHG | BODY MASS INDEX: 31.65 KG/M2 | RESPIRATION RATE: 18 BRPM | TEMPERATURE: 97 F | OXYGEN SATURATION: 97 % | HEART RATE: 72 BPM | HEIGHT: 65 IN | WEIGHT: 190 LBS

## 2023-01-07 DIAGNOSIS — M54.50 ACUTE MIDLINE LOW BACK PAIN WITHOUT SCIATICA: Primary | ICD-10-CM

## 2023-01-07 LAB
ALBUMIN SERPL-MCNC: 3.6 G/DL (ref 3.4–5)
ALBUMIN/GLOB SERPL: 1.1 {RATIO} (ref 1–2)
ALP LIVER SERPL-CCNC: 88 U/L
ALT SERPL-CCNC: 36 U/L
ANION GAP SERPL CALC-SCNC: 10 MMOL/L (ref 0–18)
AST SERPL-CCNC: 52 U/L (ref 15–37)
BASOPHILS # BLD AUTO: 0.06 X10(3) UL (ref 0–0.2)
BASOPHILS NFR BLD AUTO: 0.7 %
BILIRUB SERPL-MCNC: 0.4 MG/DL (ref 0.1–2)
BILIRUB UR QL STRIP.AUTO: NEGATIVE
BUN BLD-MCNC: 6 MG/DL (ref 7–18)
CALCIUM BLD-MCNC: 8.6 MG/DL (ref 8.5–10.1)
CHLORIDE SERPL-SCNC: 91 MMOL/L (ref 98–112)
CLARITY UR REFRACT.AUTO: CLEAR
CO2 SERPL-SCNC: 25 MMOL/L (ref 21–32)
COLOR UR AUTO: YELLOW
CREAT BLD-MCNC: 0.73 MG/DL
EOSINOPHIL # BLD AUTO: 0.1 X10(3) UL (ref 0–0.7)
EOSINOPHIL NFR BLD AUTO: 1.1 %
ERYTHROCYTE [DISTWIDTH] IN BLOOD BY AUTOMATED COUNT: 11.6 %
ERYTHROCYTE [SEDIMENTATION RATE] IN BLOOD: 6 MM/HR
GFR SERPLBLD BASED ON 1.73 SQ M-ARVRAT: 101 ML/MIN/1.73M2 (ref 60–?)
GLOBULIN PLAS-MCNC: 3.4 G/DL (ref 2.8–4.4)
GLUCOSE BLD-MCNC: 110 MG/DL (ref 70–99)
GLUCOSE UR STRIP.AUTO-MCNC: NEGATIVE MG/DL
HCT VFR BLD AUTO: 37 %
HGB BLD-MCNC: 13.7 G/DL
IMM GRANULOCYTES # BLD AUTO: 0.03 X10(3) UL (ref 0–1)
IMM GRANULOCYTES NFR BLD: 0.3 %
KETONES UR STRIP.AUTO-MCNC: NEGATIVE MG/DL
LYMPHOCYTES # BLD AUTO: 2.12 X10(3) UL (ref 1–4)
LYMPHOCYTES NFR BLD AUTO: 23.8 %
MCH RBC QN AUTO: 32.9 PG (ref 26–34)
MCHC RBC AUTO-ENTMCNC: 37 G/DL (ref 31–37)
MCV RBC AUTO: 88.7 FL
MONOCYTES # BLD AUTO: 1.07 X10(3) UL (ref 0.1–1)
MONOCYTES NFR BLD AUTO: 12 %
NEUTROPHILS # BLD AUTO: 5.54 X10 (3) UL (ref 1.5–7.7)
NEUTROPHILS # BLD AUTO: 5.54 X10(3) UL (ref 1.5–7.7)
NEUTROPHILS NFR BLD AUTO: 62.1 %
NITRITE UR QL STRIP.AUTO: NEGATIVE
OSMOLALITY SERPL CALC.SUM OF ELEC: 260 MOSM/KG (ref 275–295)
PH UR STRIP.AUTO: 5.5 [PH] (ref 5–8)
PLATELET # BLD AUTO: 218 10(3)UL (ref 150–450)
POTASSIUM SERPL-SCNC: 3.8 MMOL/L (ref 3.5–5.1)
PROT SERPL-MCNC: 7 G/DL (ref 6.4–8.2)
PROT UR STRIP.AUTO-MCNC: NEGATIVE MG/DL
RBC # BLD AUTO: 4.17 X10(6)UL
RBC UR QL AUTO: NEGATIVE
SODIUM SERPL-SCNC: 126 MMOL/L (ref 136–145)
SP GR UR STRIP.AUTO: <=1.005 (ref 1–1.03)
UROBILINOGEN UR STRIP.AUTO-MCNC: 0.2 MG/DL
WBC # BLD AUTO: 8.9 X10(3) UL (ref 4–11)

## 2023-01-07 PROCEDURE — 99285 EMERGENCY DEPT VISIT HI MDM: CPT

## 2023-01-07 PROCEDURE — 85652 RBC SED RATE AUTOMATED: CPT | Performed by: EMERGENCY MEDICINE

## 2023-01-07 PROCEDURE — 72110 X-RAY EXAM L-2 SPINE 4/>VWS: CPT | Performed by: EMERGENCY MEDICINE

## 2023-01-07 PROCEDURE — 87086 URINE CULTURE/COLONY COUNT: CPT | Performed by: EMERGENCY MEDICINE

## 2023-01-07 PROCEDURE — 96375 TX/PRO/DX INJ NEW DRUG ADDON: CPT

## 2023-01-07 PROCEDURE — 81015 MICROSCOPIC EXAM OF URINE: CPT | Performed by: EMERGENCY MEDICINE

## 2023-01-07 PROCEDURE — 80053 COMPREHEN METABOLIC PANEL: CPT | Performed by: EMERGENCY MEDICINE

## 2023-01-07 PROCEDURE — 85025 COMPLETE CBC W/AUTO DIFF WBC: CPT | Performed by: EMERGENCY MEDICINE

## 2023-01-07 PROCEDURE — 96374 THER/PROPH/DIAG INJ IV PUSH: CPT

## 2023-01-07 PROCEDURE — 81001 URINALYSIS AUTO W/SCOPE: CPT | Performed by: EMERGENCY MEDICINE

## 2023-01-07 PROCEDURE — 99284 EMERGENCY DEPT VISIT MOD MDM: CPT

## 2023-01-07 RX ORDER — HYDROCODONE BITARTRATE AND ACETAMINOPHEN 5; 325 MG/1; MG/1
1-2 TABLET ORAL EVERY 6 HOURS PRN
Qty: 10 TABLET | Refills: 0 | Status: SHIPPED | OUTPATIENT
Start: 2023-01-07 | End: 2023-01-12

## 2023-01-07 RX ORDER — CYCLOBENZAPRINE HCL 10 MG
10 TABLET ORAL 3 TIMES DAILY PRN
Qty: 20 TABLET | Refills: 0 | Status: SHIPPED | OUTPATIENT
Start: 2023-01-07 | End: 2023-01-14

## 2023-01-07 RX ORDER — HYDROMORPHONE HYDROCHLORIDE 1 MG/ML
0.5 INJECTION, SOLUTION INTRAMUSCULAR; INTRAVENOUS; SUBCUTANEOUS ONCE
Status: COMPLETED | OUTPATIENT
Start: 2023-01-07 | End: 2023-01-07

## 2023-01-07 RX ORDER — CYCLOBENZAPRINE HCL 10 MG
10 TABLET ORAL ONCE
Status: COMPLETED | OUTPATIENT
Start: 2023-01-07 | End: 2023-01-07

## 2023-01-07 RX ORDER — ONDANSETRON 2 MG/ML
4 INJECTION INTRAMUSCULAR; INTRAVENOUS ONCE
Status: COMPLETED | OUTPATIENT
Start: 2023-01-07 | End: 2023-01-07

## 2023-01-07 RX ORDER — KETOROLAC TROMETHAMINE 30 MG/ML
30 INJECTION, SOLUTION INTRAMUSCULAR; INTRAVENOUS ONCE
Status: COMPLETED | OUTPATIENT
Start: 2023-01-07 | End: 2023-01-07

## 2023-06-12 ENCOUNTER — OFFICE VISIT (OUTPATIENT)
Dept: FAMILY MEDICINE CLINIC | Facility: CLINIC | Age: 48
End: 2023-06-12
Payer: COMMERCIAL

## 2023-06-12 VITALS
TEMPERATURE: 98 F | SYSTOLIC BLOOD PRESSURE: 140 MMHG | WEIGHT: 185 LBS | OXYGEN SATURATION: 97 % | DIASTOLIC BLOOD PRESSURE: 94 MMHG | HEART RATE: 71 BPM | BODY MASS INDEX: 30.82 KG/M2 | RESPIRATION RATE: 18 BRPM | HEIGHT: 65 IN

## 2023-06-12 DIAGNOSIS — J02.9 SORE THROAT: ICD-10-CM

## 2023-06-12 DIAGNOSIS — J06.9 VIRAL UPPER RESPIRATORY ILLNESS: Primary | ICD-10-CM

## 2023-06-12 LAB
CONTROL LINE PRESENT WITH A CLEAR BACKGROUND (YES/NO): YES YES/NO
KIT LOT #: NORMAL NUMERIC
STREP GRP A CUL-SCR: NEGATIVE

## 2023-06-12 PROCEDURE — 87081 CULTURE SCREEN ONLY: CPT | Performed by: NURSE PRACTITIONER

## 2023-08-28 ENCOUNTER — APPOINTMENT (OUTPATIENT)
Dept: GENERAL RADIOLOGY | Facility: HOSPITAL | Age: 48
End: 2023-08-28
Attending: EMERGENCY MEDICINE
Payer: COMMERCIAL

## 2023-08-28 PROCEDURE — 71045 X-RAY EXAM CHEST 1 VIEW: CPT | Performed by: EMERGENCY MEDICINE

## 2023-09-28 RX ORDER — FLUOXETINE HYDROCHLORIDE 20 MG/1
20 CAPSULE ORAL DAILY
COMMUNITY

## 2023-10-17 ENCOUNTER — HOSPITAL ENCOUNTER (OUTPATIENT)
Facility: HOSPITAL | Age: 48
Setting detail: HOSPITAL OUTPATIENT SURGERY
Discharge: HOME | End: 2023-10-17
Attending: INTERNAL MEDICINE | Admitting: INTERNAL MEDICINE
Payer: COMMERCIAL

## 2023-10-17 ENCOUNTER — ANESTHESIA EVENT (OUTPATIENT)
Dept: ENDOSCOPY | Facility: HOSPITAL | Age: 48
End: 2023-10-17
Payer: COMMERCIAL

## 2023-10-17 ENCOUNTER — ANESTHESIA (OUTPATIENT)
Dept: ENDOSCOPY | Facility: HOSPITAL | Age: 48
End: 2023-10-17
Payer: COMMERCIAL

## 2023-10-17 VITALS
RESPIRATION RATE: 18 BRPM | SYSTOLIC BLOOD PRESSURE: 118 MMHG | HEART RATE: 56 BPM | TEMPERATURE: 98 F | OXYGEN SATURATION: 99 % | BODY MASS INDEX: 28.32 KG/M2 | HEIGHT: 65 IN | WEIGHT: 170 LBS | DIASTOLIC BLOOD PRESSURE: 77 MMHG

## 2023-10-17 PROCEDURE — 88307 TISSUE EXAM BY PATHOLOGIST: CPT | Performed by: INTERNAL MEDICINE

## 2023-10-17 PROCEDURE — BF45ZZZ ULTRASONOGRAPHY OF LIVER: ICD-10-PCS | Performed by: INTERNAL MEDICINE

## 2023-10-17 PROCEDURE — 88313 SPECIAL STAINS GROUP 2: CPT | Performed by: INTERNAL MEDICINE

## 2023-10-17 PROCEDURE — BD47ZZZ ULTRASONOGRAPHY OF GASTROINTESTINAL TRACT: ICD-10-PCS | Performed by: INTERNAL MEDICINE

## 2023-10-17 PROCEDURE — 0FB04ZX EXCISION OF LIVER, PERCUTANEOUS ENDOSCOPIC APPROACH, DIAGNOSTIC: ICD-10-PCS | Performed by: INTERNAL MEDICINE

## 2023-10-17 PROCEDURE — 88305 TISSUE EXAM BY PATHOLOGIST: CPT | Performed by: INTERNAL MEDICINE

## 2023-10-17 RX ORDER — NICOTINE POLACRILEX 4 MG
15 LOZENGE BUCCAL
Status: DISCONTINUED | OUTPATIENT
Start: 2023-10-17 | End: 2023-10-17

## 2023-10-17 RX ORDER — NICOTINE POLACRILEX 4 MG
30 LOZENGE BUCCAL
Status: DISCONTINUED | OUTPATIENT
Start: 2023-10-17 | End: 2023-10-17

## 2023-10-17 RX ORDER — HYDROMORPHONE HYDROCHLORIDE 1 MG/ML
INJECTION, SOLUTION INTRAMUSCULAR; INTRAVENOUS; SUBCUTANEOUS
Status: COMPLETED
Start: 2023-10-17 | End: 2023-10-17

## 2023-10-17 RX ORDER — NALOXONE HYDROCHLORIDE 0.4 MG/ML
80 INJECTION, SOLUTION INTRAMUSCULAR; INTRAVENOUS; SUBCUTANEOUS AS NEEDED
Status: DISCONTINUED | OUTPATIENT
Start: 2023-10-17 | End: 2023-10-17

## 2023-10-17 RX ORDER — LEVOFLOXACIN 5 MG/ML
INJECTION, SOLUTION INTRAVENOUS
Status: DISCONTINUED
Start: 2023-10-17 | End: 2023-10-17 | Stop reason: WASHOUT

## 2023-10-17 RX ORDER — LIDOCAINE HYDROCHLORIDE 10 MG/ML
INJECTION, SOLUTION EPIDURAL; INFILTRATION; INTRACAUDAL; PERINEURAL AS NEEDED
Status: DISCONTINUED | OUTPATIENT
Start: 2023-10-17 | End: 2023-10-17 | Stop reason: SURG

## 2023-10-17 RX ORDER — SODIUM CHLORIDE, SODIUM LACTATE, POTASSIUM CHLORIDE, CALCIUM CHLORIDE 600; 310; 30; 20 MG/100ML; MG/100ML; MG/100ML; MG/100ML
INJECTION, SOLUTION INTRAVENOUS CONTINUOUS
Status: DISCONTINUED | OUTPATIENT
Start: 2023-10-17 | End: 2023-10-17

## 2023-10-17 RX ORDER — ONDANSETRON 2 MG/ML
4 INJECTION INTRAMUSCULAR; INTRAVENOUS AS NEEDED
Status: DISCONTINUED | OUTPATIENT
Start: 2023-10-17 | End: 2023-10-17

## 2023-10-17 RX ORDER — HYDROMORPHONE HYDROCHLORIDE 1 MG/ML
0.4 INJECTION, SOLUTION INTRAMUSCULAR; INTRAVENOUS; SUBCUTANEOUS ONCE
Status: COMPLETED | OUTPATIENT
Start: 2023-10-17 | End: 2023-10-17

## 2023-10-17 RX ORDER — INDOMETHACIN 100 MG
SUPPOSITORY, RECTAL RECTAL
Status: DISCONTINUED
Start: 2023-10-17 | End: 2023-10-17 | Stop reason: WASHOUT

## 2023-10-17 RX ORDER — DEXTROSE MONOHYDRATE 25 G/50ML
50 INJECTION, SOLUTION INTRAVENOUS
Status: DISCONTINUED | OUTPATIENT
Start: 2023-10-17 | End: 2023-10-17

## 2023-10-17 RX ORDER — MIDAZOLAM HYDROCHLORIDE 1 MG/ML
INJECTION INTRAMUSCULAR; INTRAVENOUS AS NEEDED
Status: DISCONTINUED | OUTPATIENT
Start: 2023-10-17 | End: 2023-10-17 | Stop reason: SURG

## 2023-10-17 RX ORDER — LEVOFLOXACIN 5 MG/ML
500 INJECTION, SOLUTION INTRAVENOUS ONCE
Status: DISCONTINUED | OUTPATIENT
Start: 2023-10-17 | End: 2023-10-17

## 2023-10-17 RX ADMIN — MIDAZOLAM HYDROCHLORIDE 2 MG: 1 INJECTION INTRAMUSCULAR; INTRAVENOUS at 14:32:00

## 2023-10-17 RX ADMIN — SODIUM CHLORIDE, SODIUM LACTATE, POTASSIUM CHLORIDE, CALCIUM CHLORIDE: 600; 310; 30; 20 INJECTION, SOLUTION INTRAVENOUS at 14:32:00

## 2023-10-17 RX ADMIN — LIDOCAINE HYDROCHLORIDE 30 MG: 10 INJECTION, SOLUTION EPIDURAL; INFILTRATION; INTRACAUDAL; PERINEURAL at 14:36:00

## 2023-10-17 RX ADMIN — SODIUM CHLORIDE, SODIUM LACTATE, POTASSIUM CHLORIDE, CALCIUM CHLORIDE: 600; 310; 30; 20 INJECTION, SOLUTION INTRAVENOUS at 15:03:00

## 2023-10-17 NOTE — ANESTHESIA POSTPROCEDURE EVALUATION
[facility]    Feliberto Arellano Patient Status:  Hospital Outpatient Surgery   Age/Gender 51year old female MRN [id number]   Location [address] [address] Attending Andrea Levine MD   Hosp Day # 0 PCP Mikaela Cooper MD       Anesthesia Post-op Note    ESOPHAGOGASTRODUODENOSCOPY (EGD), UPPER ENDOSCOPIC ULTRASOUND WITH FINE NEEDLE ASPIRATION    Procedure Summary       Date: 10/17/23 Room / Location: [facility] ENDOSCOPY 01 / [facility] ENDOSCOPY    Anesthesia Start: 1007 Anesthesia Stop: 8648    Procedures:       ESOPHAGOGASTRODUODENOSCOPY (EGD), UPPER ENDOSCOPIC ULTRASOUND WITH FINE NEEDLE ASPIRATION      ESOPHAGOGASTRODUODENOSCOPY (EGD) Diagnosis: (EGD: portal hypertensive gastropathy EUS: gastropathy)    Surgeons: Andrea Levine MD Anesthesiologist: Kg Delgado MD    Anesthesia Type: MAC ASA Status: 2            Anesthesia Type: MAC    Vitals Value Taken Time   /70 10/17/23 1503   Temp  10/17/23 1503   Pulse 66 10/17/23 1503   Resp 16 10/17/23 1503   SpO2 100 10/17/23 1503       Patient Location: Endoscopy    Anesthesia Type: MAC    Airway Patency: patent    Postop Pain Control: adequate    Mental Status: mildly sedated but able to meaningfully participate in the post-anesthesia evaluation    Nausea/Vomiting: none    Cardiopulmonary/Hydration status: stable euvolemic    Complications: no apparent anesthesia related complications    Postop vital signs: stable    Dental Exam: Unchanged from Postop

## 2023-10-17 NOTE — DISCHARGE INSTRUCTIONS
Home Care Instructions for Gastroscopy/ERCP/Endoscopic Ultrasound with Sedation    Diet:  - Resume your regular diet as tolerated unless otherwise instructed. - Start with light meals to minimize bloating.  - Do not drink alcohol today. Medication:  - If you have questions about resuming your normal medications, please contact your Primary Care Physician. Activities:  - Take it easy today. Do not return to work today. - Do not drive today. - Do not operate any machinery today (including kitchen equipment). Gastroscopy:  - You may have a sore throat for 2-3 days following the exam. This is normal. Gargling with warm salt water (1/2 tsp salt to 1 glass warm water) or using throat lozenges will help. - If you experience any sharp pain in your neck, abdomen or chest, vomiting of blood, oral temperature over 100 degrees Fahrenheit, light-headedness or dizziness, or any other problems, contact your doctor. **If unable to reach your doctor, please go to the [facility] Emergency Room**    - Your referring physician will receive a full report of your examination.  - If you do not hear from your doctor's office within two weeks of your biopsy, please call them for your results.

## 2023-10-17 NOTE — OPERATIVE REPORT
[address] OPERATIVE REPORT   PATIENT NAME: Sergei Jones  MRN: [id number]  DATE OF OPERATION: 10/17/2023  PREOPERATIVE DIAGNOSIS: nausea, vomiting, elevated liver tests, elevated liver tests, history of alcohol abuse; normal gallbladder and CBD on MRCP without choledocholithiasis  POSTOPERATIVE DIAGNOSIS:    1. EGD    - mild portal hypertensive gastropathy    - small Hill type II 2 cm hiatal hernia   2. EUS    - hepatic steatosis    - normal pancreas    - normal biliary tree without ductal dilation or choledocholithiasis    - liver biopsy performed   PROCEDURE PERFORMED: upper endoscopy/ ENDOSCOPIC ultrasound with core liver biopsy  SEDATION MEDICATIONS: MAC  PREOPERATIVE MEDICATIONS:   PREPROCEDURE ASSESSMENT: The indication for this procedure is to assess for source of elevated liver enzymes. The patient was identified by myself and nursing staff in the exam room. Informed consent was obtained. The patient was seen in clinic and a full H&P was obtained. On brief physical examination, airway is patent. Chest is clear. Heart has regular rate and rhythm. Abdomen is soft, nontender with good bowel sounds. A medication list was taken by nursing today and reviewed by myself. The patient is an ASA grade 2. PROCEDURE NOTE: The procedure was completed without difficulty. The patient tolerated the procedure well. Upper endoscopy (EGD): The endoscope was inserted through the mouth and advanced to the level of the duodenum, 3rd portion. Visualized portion of the esophagus, stomach including antrum, body, fundus and cardiac, and duodenum were normal.  Endoscopic ultrasound (EUS):  Endoscopic ultrasound was performed using the linear echoendoscope. Images were obtained. LIVER: Left lobe of the liver was visualized and no mass or lesions seen. No intrahepatic duct dilation was noted. Portal vein was noted to come out of the liver and the portal confluence was seen and pancreas was noted.   Small hiatal hernia was noted. There was some erythema in the gastric body and some in the antrum. Features are suggestive of portal hypertensive gastropathy in the body of the stomach no gastric or esophageal varices were noted. FNB needle was used with color flow doppler to make sure there no intervening vessels. 2 passes with 20G FNB needle were made. Adequate tissue was obtained for histology. PANCREAS:  Pancreatic neck, body, and tail were interrogated from the gastric body while the neck, head and uncinate were examined from the 1st and 2nd duodenum. PD - normal throughout  - neck: 1.1 mm  - head: 2.5 mm  Pancreas divisum:  no  Chronic pancreatitis changes:  no  Neoplasm: no   Cysts:   no  Biliary Tree:  - common hepatic duct: 5 mm  - distal: 4 mm  - stones: no  GALLBLADDER: visualized and was normal without stones or sludge  CELIAC AXIS:  visualized without lymphadenopathy  L ADRENAL GLAND:  visualized   L KIDNEY:  visualized   MEDIASTINUM:  visualized without lymphadenopathy  Scope was withdrawn from the patient and patient tolerated the procedure well. FINDINGS   Episodic nausea, vomiting. Most likely has cyclic vomiting syndrome  Elevated liver tests with positive AMA  Hepatic steatosis  Portal hypertensive gastropathy suspected  RECOMMENDATIONS: will check liver biopsy. F/u with Dr. Freddy Mosley:  On discharge, the patient was given an after-visit summary detailing the procedure, findings, followup plans, and an updated medication list.     Thank you very much for the consultation. I really appreciate it.     Smith Montoya MD

## 2023-10-19 ENCOUNTER — HOSPITAL ENCOUNTER (EMERGENCY)
Facility: HOSPITAL | Age: 48
Discharge: LEFT BEFORE DISCHARGE | End: 2023-10-19
Payer: COMMERCIAL

## 2023-10-19 VITALS
HEART RATE: 74 BPM | SYSTOLIC BLOOD PRESSURE: 189 MMHG | RESPIRATION RATE: 18 BRPM | DIASTOLIC BLOOD PRESSURE: 90 MMHG | OXYGEN SATURATION: 98 % | TEMPERATURE: 98 F

## 2023-10-19 NOTE — ED INITIAL ASSESSMENT (HPI)
Patient to the ER c/o pain in LLQ. Patient had EGD on Tuesday reports having pain today. Patient reports no pain meds at home. No tylenol or motrin.  No fever no n/v/d

## 2024-01-27 ENCOUNTER — OFFICE VISIT (OUTPATIENT)
Dept: FAMILY MEDICINE CLINIC | Facility: CLINIC | Age: 49
End: 2024-01-27
Payer: COMMERCIAL

## 2024-01-27 VITALS
BODY MASS INDEX: 29.99 KG/M2 | WEIGHT: 180 LBS | RESPIRATION RATE: 18 BRPM | OXYGEN SATURATION: 98 % | TEMPERATURE: 98 F | HEART RATE: 70 BPM | DIASTOLIC BLOOD PRESSURE: 82 MMHG | SYSTOLIC BLOOD PRESSURE: 136 MMHG | HEIGHT: 65 IN

## 2024-01-27 DIAGNOSIS — J02.9 SORE THROAT: Primary | ICD-10-CM

## 2024-01-27 DIAGNOSIS — R05.1 ACUTE COUGH: ICD-10-CM

## 2024-01-27 DIAGNOSIS — J01.00 ACUTE NON-RECURRENT MAXILLARY SINUSITIS: ICD-10-CM

## 2024-01-27 LAB
CONTROL LINE PRESENT WITH A CLEAR BACKGROUND (YES/NO): YES YES/NO
KIT LOT #: NORMAL NUMERIC

## 2024-01-27 PROCEDURE — 99213 OFFICE O/P EST LOW 20 MIN: CPT | Performed by: NURSE PRACTITIONER

## 2024-01-27 PROCEDURE — 3008F BODY MASS INDEX DOCD: CPT | Performed by: NURSE PRACTITIONER

## 2024-01-27 PROCEDURE — 87880 STREP A ASSAY W/OPTIC: CPT | Performed by: NURSE PRACTITIONER

## 2024-01-27 PROCEDURE — 3079F DIAST BP 80-89 MM HG: CPT | Performed by: NURSE PRACTITIONER

## 2024-01-27 PROCEDURE — 3075F SYST BP GE 130 - 139MM HG: CPT | Performed by: NURSE PRACTITIONER

## 2024-01-27 RX ORDER — AMOXICILLIN AND CLAVULANATE POTASSIUM 875; 125 MG/1; MG/1
1 TABLET, FILM COATED ORAL 2 TIMES DAILY
Qty: 20 TABLET | Refills: 0 | Status: SHIPPED | OUTPATIENT
Start: 2024-01-27 | End: 2024-02-06

## 2024-01-27 RX ORDER — BENZONATATE 200 MG/1
200 CAPSULE ORAL 3 TIMES DAILY PRN
Qty: 20 CAPSULE | Refills: 0 | Status: SHIPPED | OUTPATIENT
Start: 2024-01-27

## 2024-01-27 NOTE — PROGRESS NOTES
CHIEF COMPLAINT:     Chief Complaint   Patient presents with    Cough     Deep cough, congestion ,fullness on both ears, slight sore throat, and 100 fever. Symptoms started a week ago   OTC: Nyquil and Dayquill           HPI:   Cleopatra Miller is a 48 year old female who presents for upper respiratory symptoms for  1 weeks. Patient reports sore throat, congestion, low grade fever, dry cough. Reports sinus pressure around cheeks.  States lots of sinus drainage.  Daughter was sick last week with similar symptoms.  Did not take Covid test. Symptoms have been worsening since onset.  Treating symptoms with otc cold meds.   Associated symptoms include some fatigue.    Current Outpatient Medications   Medication Sig Dispense Refill    benzonatate 200 MG Oral Cap Take 1 capsule (200 mg total) by mouth 3 (three) times daily as needed for cough. 20 capsule 0    amoxicillin clavulanate 875-125 MG Oral Tab Take 1 tablet by mouth 2 (two) times daily for 10 days. 20 tablet 0    ondansetron (ZOFRAN) 4 mg tablet Take 1 tablet (4 mg total) by mouth every 8 (eight) hours as needed for Nausea.      LOSARTAN 100 MG Oral Tab TAKE 1 TABLET BY MOUTH ONCE DAILY 90 tablet 0    METOPROLOL SUCCINATE ER 25 MG Oral Tablet 24 Hr TAKE ONE TABLET BY MOUTH ONCE DAILY 90 tablet 0    OMEPRAZOLE 20 MG Oral Capsule Delayed Release TAKE ONE CAPSULE BY MOUTH ONCE DAILY 90 capsule 0    FLUoxetine 20 MG Oral Cap Take 1 capsule (20 mg total) by mouth daily. (Patient not taking: Reported on 1/27/2024)      magnesium oxide 400 MG Oral Tab Take 1 tablet (400 mg total) by mouth daily. (Patient not taking: Reported on 1/27/2024) 7 tablet 0    LEVOCETIRIZINE DIHYDROCHLORIDE 5 MG Oral Tab Take 1 tablet (5 mg total) by mouth 2 (two) times daily. (Patient not taking: Reported on 1/27/2024) 60 tablet 0    SUMAtriptan Succinate 100 MG Oral Tab Take 1 tablet by mouth every 2 hours as needed for migraine 9 tablet 3      Past Medical History:   Diagnosis Date    ANXIETY      Anxiety state     CHICKEN POX     MIKE I (cervical intraepithelial neoplasia I) 2012    DEPRESSION     postpartum on wellbutrin    Essential hypertension     FHx: migraine headaches     Gestational diabetes     insulin controlled    High blood pressure     HPV in female 10/2012    HYPERTENSION     IUD (intrauterine device) in place 2016    Mirena Placed for Heavy Menses    Migraines     Mild dysplasia of cervix 10/29/2010    on colpo    Visual impairment     readers      Past Surgical History:   Procedure Laterality Date          x1    COLONOSCOPY  2017    7 mm polyp (heavily cauterized, favoring hyperplastic), int hemorrhoids, repeat     COLONOSCOPY  2021    Left sided diverticulosis, Normal colon and TI mucosa. small int hemorrhoids    COLPOSCOPY,BX CERVIX/ENDOCERV CURR  10/29/2010    MIKE 1    COLPOSCOPY,BX CERVIX/ENDOCERV CURR  2012     MIKE 1    CORRECT BUNION,OTHR METHODS      with bone rotation    EGD  2021    Mild gastritis, small hiatal hernia, nonobstructive schatzki's ring s/p biopsies, normal duodenum          x 3    TUBAL LIGATION           Social History     Socioeconomic History    Marital status:    Tobacco Use    Smoking status: Never    Smokeless tobacco: Never   Vaping Use    Vaping Use: Never used   Substance and Sexual Activity    Alcohol use: Yes     Alcohol/week: 1.0 - 4.0 standard drink of alcohol     Types: 1 - 4 Shots of liquor per week    Drug use: No    Sexual activity: Yes     Partners: Male     Birth control/protection: Tubal Ligation, Vasectomy         REVIEW OF SYSTEMS:   GENERAL: feels well otherwise,   ok appetite  SKIN: no rashes or abnormal skin lesions  HEENT: See HPI  LUNGS: denies shortness of breath or wheezing, See HPI  CARDIOVASCULAR: denies chest pain or palpitations   GI: denies N/V/C or abdominal pain  NEURO: Denies headaches    EXAM:   /82   Pulse 70   Temp 98.2 °F (36.8 °C)   Resp 18   Ht 5' 5\" (1.651  m)   Wt 180 lb (81.6 kg)   LMP 09/21/2023 (Approximate)   SpO2 98%   BMI 29.95 kg/m²   GENERAL: well developed, well nourished,in no apparent distress  SKIN: no rashes,no suspicious lesions  HEAD: atraumatic, normocephalic.  + tenderness on palpation of maxillary or frontal sinuses  EYES: conjunctiva clear, EOM intact  EARS: TM's pearly, no bulging, no retraction,no fluid, bony landmarks visualized  NOSE: Nostrils patent, clear nasal discharge, nasal mucosa pink and moist  THROAT: Oral mucosa pink, moist. Posterior pharynx is mildly erythematous. no exudates.  NECK: Supple, non-tender  LUNGS: clear to auscultation bilaterally, no wheezes or rhonchi.  No crackles/rales, good air movement throughout. Breathing is non labored. Dry cough with deep breathing.   CARDIO: RRR without murmur  EXTREMITIES: no cyanosis, clubbing or edema  LYMPH:  + ant. cervical lymphadenopathy.        ASSESSMENT AND PLAN:   Cleopatra Miller is a 48 year old female who presents with     ASSESSMENT:   Encounter Diagnoses   Name Primary?    Sore throat Yes    Acute non-recurrent maxillary sinusitis     Acute cough        PLAN:  D/w paitent likely viral URI.  Advised typically lasts 1-2 week. Recommend waiting 3-4 days to see if improvement prior to starting abx. Meds as below.  Mucinex to help thin secretions.    Follow up with PCP if no improvement in 3-5 days, sooner if worsening.  If any sob/wheezing seek emergent care.Comfort care as described in Patient Instructions    Meds & Refills for this Visit:  Requested Prescriptions     Signed Prescriptions Disp Refills    benzonatate 200 MG Oral Cap 20 capsule 0     Sig: Take 1 capsule (200 mg total) by mouth 3 (three) times daily as needed for cough.    amoxicillin clavulanate 875-125 MG Oral Tab 20 tablet 0     Sig: Take 1 tablet by mouth 2 (two) times daily for 10 days.       Risks, benefits, and side effects of medication explained and discussed.    There are no Patient Instructions on file  for this visit.    The patient indicates understanding of these issues and agrees to the plan.  The patient is asked to return if sx's persist or worsen.

## 2024-05-22 ENCOUNTER — APPOINTMENT (OUTPATIENT)
Dept: CT IMAGING | Age: 49
End: 2024-05-22
Attending: PHYSICIAN ASSISTANT

## 2024-05-22 ENCOUNTER — HOSPITAL ENCOUNTER (EMERGENCY)
Age: 49
Discharge: HOME | End: 2024-05-22
Attending: EMERGENCY MEDICINE

## 2024-05-22 VITALS
WEIGHT: 180 LBS | SYSTOLIC BLOOD PRESSURE: 108 MMHG | HEIGHT: 65 IN | TEMPERATURE: 98 F | RESPIRATION RATE: 18 BRPM | DIASTOLIC BLOOD PRESSURE: 71 MMHG | HEART RATE: 109 BPM | OXYGEN SATURATION: 96 % | BODY MASS INDEX: 29.99 KG/M2

## 2024-05-22 DIAGNOSIS — S01.01XA LACERATION OF SCALP, INITIAL ENCOUNTER: Primary | ICD-10-CM

## 2024-05-22 PROCEDURE — 12002 RPR S/N/AX/GEN/TRNK2.6-7.5CM: CPT

## 2024-05-22 PROCEDURE — 70450 CT HEAD/BRAIN W/O DYE: CPT | Performed by: PHYSICIAN ASSISTANT

## 2024-05-22 PROCEDURE — 99284 EMERGENCY DEPT VISIT MOD MDM: CPT

## 2024-05-22 PROCEDURE — 12002 RPR S/N/AX/GEN/TRNK2.6-7.5CM: CPT | Performed by: PHYSICIAN ASSISTANT

## 2024-05-22 PROCEDURE — 90471 IMMUNIZATION ADMIN: CPT

## 2024-05-22 RX ORDER — CEPHALEXIN 500 MG/1
500 CAPSULE ORAL 3 TIMES DAILY
Qty: 21 CAPSULE | Refills: 0 | Status: SHIPPED | OUTPATIENT
Start: 2024-05-22 | End: 2024-05-29

## 2024-05-22 NOTE — DISCHARGE INSTRUCTIONS
You may ice the area.  Be gentle with the area.  I expect further bleeding.  Staple removal in 10 days.  Take antibiotic as written

## 2024-05-22 NOTE — ED PROVIDER NOTES
Patient was cleaning her bathroom last night at about 8 PM.  Before was when she slipped.  She fell striking her head on the vanity.  Sustained laceration to the occipital scalp.  It was bleeding quite heavily and she shows me a picture of the floor with quite a bit of blood but she was able to clean up and eventually it stopped.  She had an interview today but during the interview it started bleeding.  She showed the wound to a neighbor who recommended she come to the Emergency Department for evaluation.  No visual change.  No dental injury.  No jaw pain.  No neck pain.  No numbness or weakness or incoordination of her body.  Patient cannot recall her last tetanus immunization being within 5 years    On examination, this her adult woman who appears in no distress awake, alert, conversant.  Scalp remarkable for a laceration on the occipital scalp with gaping wound edges with soft tissue swelling and some venous oozing.      Wound was anesthetized and copious irrigated.  I discussed with patient that this wound has been open for nearly 20 hours putting her at higher risk of infection.  Still reasonable to close but with the caveat that if it does become infected, the wound will need to be open to heal by secondary intention.  I will discharge home on antibiotic and recommend meticulous wound care with wound check with her primary care doctor within a few days.    Please refer to the PA note regarding wound anesthesia and closure.    I provided a substantive portion of care for this patient. I personally performed the medical decision making for this encounter.

## 2024-05-22 NOTE — ED INITIAL ASSESSMENT (HPI)
Pt slipped and fell last night hitting head on bathroom counter, laceration has been bleeding since. No LOC, no blood thinners. Laceration noted to posterior scalp.

## 2024-05-22 NOTE — ED PROVIDER NOTES
Patient Seen in: [facility] In [place]      History     Chief Complaint   Patient presents with    Laceration/Abrasion    Fall     Stated Complaint: Fell on bathroom floor, hit head on edge of counter last night. \"Won't stop ble*    Subjective:   HPI    48-year-old female.  Yesterday evening, the patient was cleaning her bathroom.  She had a product on the floor, it was wet.  She slipped and fell striking her occipital region on the edge of her vanity.  She sustained a moderate laceration.  Unfortunately, she has a young daughter, and was unable to seek medical care last night.  The wound has continued to bleed.  She has had to change multiple bandages.  Today, she does endorse some headache and generalized malaise.  Low-grade nausea without vomiting.  Denies any neck pain.  Unsure of tetanus status.    Objective:   Past Medical History:    ANXIETY    Anxiety state    CHICKEN POX    MIKE I (cervical intraepithelial neoplasia I)    DEPRESSION    postpartum on wellbutrin    Essential hypertension    FHx: migraine headaches    Gestational diabetes (HCC)    insulin controlled    High blood pressure    HPV in female    HYPERTENSION    IUD (intrauterine device) in place    Mirena Placed for Heavy Menses    Migraines    Mild dysplasia of cervix    on colpo    Visual impairment    readers              Past Surgical History:   Procedure Laterality Date          x1    Colonoscopy  2017    7 mm polyp (heavily cauterized, favoring hyperplastic), int hemorrhoids, repeat     Colonoscopy  2021    Left sided diverticulosis, Normal colon and TI mucosa. small int hemorrhoids    Colposcopy,bx cervix/endocerv curr  10/29/2010    MIKE 1    Colposcopy,bx cervix/endocerv curr  2012     MIKE 1    Correct bunion,othr methods      with bone rotation    Egd  2021    Mild gastritis, small hiatal hernia, nonobstructive schatzki's ring s/p biopsies, normal duodenum          x 3     Tubal ligation                  Social History     Socioeconomic History    Marital status:    Tobacco Use    Smoking status: Never    Smokeless tobacco: Never   Vaping Use    Vaping status: Never Used   Substance and Sexual Activity    Alcohol use: Yes     Alcohol/week: 1.0 - 4.0 standard drink of alcohol     Types: 1 - 4 Shots of liquor per week    Drug use: No    Sexual activity: Yes     Partners: Male     Birth control/protection: Tubal Ligation, Vasectomy     Social Determinants of Health      Received from [facility]              Review of Systems    Positive for stated complaint: Fell on bathroom floor, hit head on edge of counter last night. \"Won't stop ble*  Other systems are as noted in HPI.  Constitutional and vital signs reviewed.      All other systems reviewed and negative except as noted above.    Physical Exam     ED Triage Vitals [05/22/24 1541]   /71   Pulse 109   Resp 18   Temp 97.9 °F (36.6 °C)   Temp src Temporal   SpO2 96 %   O2 Device None (Room air)       Current Vitals:   Vital Signs  BP: 108/71  Pulse: 109  Resp: 18  Temp: 97.9 °F (36.6 °C)  Temp src: Temporal    Oxygen Therapy  SpO2: 96 %  O2 Device: None (Room air)            Physical Exam         ED Course   Labs Reviewed - No data to display          CT BRAIN OR HEAD (80294)    Result Date: 5/22/2024  PROCEDURE:  CT BRAIN OR HEAD (43335)  COMPARISON:  STIVEN CT, BRAIN W/O CONTRAST, 12/30/2009, 12:48 PM.  INDICATIONS:  Fell on bathroom floor, hit head on edge of counter last night. Won't stop bleeding.  Posterior head pain, headache  TECHNIQUE:  Noncontrast CT scanning is performed through the brain. Dose reduction techniques were used. Dose information is transmitted to the ACR (American College of Radiology) NRDR (National Radiology Data Registry) which includes the Dose Index Registry.  PATIENT STATED HISTORY: (As transcribed by Technologist)  The patient states she fell and hit her posterior head last  PM. She is having a headache and states she cant control the bleeding.    FINDINGS:  VENTRICLES/SULCI:  Ventricles and sulci are normal in size.  INTRACRANIAL:  There are no abnormal extraaxial fluid collections.  There is no midline shift.  There are no intraparenchymal brain abnormalities.  There is nothing specific for acute infarct.  There is no hemorrhage or mass lesion.  There is normal gray-white matter differentiation.  Sagittal midline images are normal. SINUSES:           No sign of acute sinusitis.  Status post endoscopic sinus surgery. MASTOIDS:          No sign of acute inflammation. SKULL:             No evidence for fracture or osseous abnormality. OTHER:             Small posterior right occipital scalp hematoma.            CONCLUSION:  No acute intracranial disease.  Small right occipital scalp hematoma.  No fracture.    LOCATION:  [id number]   Dictated by (CST): Lee Cassidy MD on 5/22/2024 at 4:16 PM     Finalized by (CST): Lee Cassidy MD on 5/22/2024 at 4:20 PM               MDM      My supervising physician was involved in the management of this patient.    Wound is roughly 20 hours old; we discussed the high risk of infection considering this    Active bleeding wound to the left posterior scalp.  Patient sent for CT of the brain.  Benign cervical exam.  Tetanus will be updated.      CONCLUSION:  No acute intracranial disease.  Small right occipital scalp hematoma.  No fracture.    LOCATION:  [id number]   Dictated by (CST): Lee Cassidy MD on 5/22/2024 at 4:16 PM     Finalized by (CST): Lee Cassidy MD on 5/22/2024 at 4:20 PM               Roughly 3 cm laceration with large underlying hematoma.    Using lidocaine with epinephrine, local injections were performed.  Site was then thoroughly cleaned    Wound edges difficult to approximate secondary to hematoma.  Site was sterilized and 5 staples were applied.  Wound  approximation was adequate     Staple removal in 10 days.   Tetanus updated.  [facility]         Medical Decision Making      Disposition and Plan     Clinical Impression:  1. Laceration of scalp, initial encounter         Disposition:  There is no disposition on file for this visit.  There is no disposition time on file for this visit.    Follow-up:  No follow-up provider specified.        Medications Prescribed:  Current Discharge Medication List        START taking these medications    Details   cephalexin 500 MG Oral Cap Take 1 capsule (500 mg total) by mouth 3 (three) times daily for 7 days.  Qty: 21 capsule, Refills: 0

## 2024-06-01 ENCOUNTER — HOSPITAL ENCOUNTER (EMERGENCY)
Age: 49
Discharge: HOME | End: 2024-06-01
Attending: STUDENT IN AN ORGANIZED HEALTH CARE EDUCATION/TRAINING PROGRAM
Payer: MEDICAID

## 2024-06-01 ENCOUNTER — OFFICE VISIT (OUTPATIENT)
Dept: FAMILY MEDICINE CLINIC | Facility: CLINIC | Age: 49
End: 2024-06-01
Payer: MEDICAID

## 2024-06-01 VITALS
HEART RATE: 80 BPM | TEMPERATURE: 97 F | RESPIRATION RATE: 16 BRPM | OXYGEN SATURATION: 100 % | SYSTOLIC BLOOD PRESSURE: 134 MMHG | DIASTOLIC BLOOD PRESSURE: 82 MMHG

## 2024-06-01 DIAGNOSIS — T81.30XA WOUND DEHISCENCE: ICD-10-CM

## 2024-06-01 DIAGNOSIS — R51.9 ACUTE INTRACTABLE HEADACHE, UNSPECIFIED HEADACHE TYPE: ICD-10-CM

## 2024-06-01 DIAGNOSIS — Z48.02 ENCOUNTER FOR STAPLE REMOVAL: ICD-10-CM

## 2024-06-01 DIAGNOSIS — S01.01XD SCALP LACERATION, SUBSEQUENT ENCOUNTER: Primary | ICD-10-CM

## 2024-06-01 DIAGNOSIS — R42 DIZZINESS: Primary | ICD-10-CM

## 2024-06-01 PROCEDURE — 12002 RPR S/N/AX/GEN/TRNK2.6-7.5CM: CPT

## 2024-06-01 PROCEDURE — 99283 EMERGENCY DEPT VISIT LOW MDM: CPT

## 2024-06-01 RX ORDER — DOXYCYCLINE HYCLATE 100 MG/1
100 CAPSULE ORAL 2 TIMES DAILY
Qty: 14 CAPSULE | Refills: 0 | Status: SHIPPED | OUTPATIENT
Start: 2024-06-01 | End: 2024-06-08

## 2024-06-01 NOTE — ED PROVIDER NOTES
Patient Seen in: [facility] In [place]      History     Chief Complaint   Patient presents with    Sut Stap RingRemoval     Stated Complaint: staple removal complication    Subjective:   HPI    Patient is a 48-year-old female presenting to the emergency department after she had gone to a walk-in clinic hoping to have staples removed from a laceration that she sustained 10 days prior in the fall.  Patient had 5 staples placed.  She did not notice any concerns other than small amount of blood occasionally on her pillow.  She had been placed on prophylactic antibiotics and took some without problem for the prescribed duration.  She has had no significant headaches, no loss of consciousness, no confusion, no visual changes, no other problems had developed.  She had gone to the walk-in clinic where 4 staples were removed and the clinician had a hard time being able to visualize the fifth staple despite significant effort and felt the patient needed to be evaluated in the emergency department.    Objective:   Past Medical History:    ANXIETY    Anxiety state    CHICKEN POX    MIKE I (cervical intraepithelial neoplasia I)    DEPRESSION    postpartum on wellbutrin    Essential hypertension    FHx: migraine headaches    Gestational diabetes (HCC)    insulin controlled    High blood pressure    HPV in female    HYPERTENSION    IUD (intrauterine device) in place    Mirena Placed for Heavy Menses    Migraines    Mild dysplasia of cervix    on colpo    Visual impairment    readers              Past Surgical History:   Procedure Laterality Date          x1    Colonoscopy  2017    7 mm polyp (heavily cauterized, favoring hyperplastic), int hemorrhoids, repeat     Colonoscopy  2021    Left sided diverticulosis, Normal colon and TI mucosa. small int hemorrhoids    Colposcopy,bx cervix/endocerv curr  10/29/2010    MIKE 1    Colposcopy,bx cervix/endocerv curr  2012     MIKE 1    Correct  val palmerhr methods      with bone rotation    Egd  2021    Mild gastritis, small hiatal hernia, nonobstructive schatzki's ring s/p biopsies, normal duodenum          x 3    Tubal ligation                  Social History     Socioeconomic History    Marital status:    Tobacco Use    Smoking status: Never    Smokeless tobacco: Never   Vaping Use    Vaping status: Never Used   Substance and Sexual Activity    Alcohol use: Yes     Alcohol/week: 1.0 - 4.0 standard drink of alcohol     Types: 1 - 4 Shots of liquor per week    Drug use: No    Sexual activity: Yes     Partners: Male     Birth control/protection: Tubal Ligation, Vasectomy     Social Determinants of Health      Received from [facility]              Review of Systems    Positive for stated complaint: suture removal  Other systems are as noted in HPI.  Constitutional and vital signs reviewed.      All other systems reviewed and negative except as noted above.    Physical Exam     ED Triage Vitals [24 1155]   /82   Pulse 80   Resp 16   Temp 97.1 °F (36.2 °C)   Temp src Temporal   SpO2 100 %   O2 Device None (Room air)       Current:/82   Pulse 80   Temp 97.1 °F (36.2 °C) (Temporal)   Resp 16   LMP  (LMP Unknown)   SpO2 100%         Physical Exam    Constitutional: No apparent distress  Eyes: No scleral icterus  Scalp: Large amount of clotted blood noted on initial inspection, not able to visualize the staple.  Skin: No rash  Neuro: Alert and oriented ×3, no facial droop, speech is clear, normal gait, no weakness.          ED Course/ My interpretations:   Labs Reviewed - No data to display    I had my tech wash the area with saline initially and soak it in saline gauze for 20 minutes.  I inspected the wound noting still significant amount of residual clot preventing from visualization of the edges of the wound and the stable itself.  I then instructed my tech to wash the area with a mixture of saline  and hydroperoxide.  Significant amount of the clot was removed with smaller clot in the center of the wound.  A single staple visualized in the center with 1 edge on the skin edge and the second side protruding into the center of the remaining clot.  Significant area of wound dehiscence noted.      Medications given:  Orders Placed This Encounter    gelatin adsorbable (Gelfoam) 100 external spone    lidocaine-EPINEPHrine (Xylocaine-Epinephrine) 1 %-1:300022 injection    doxycycline 100 MG Oral Cap                      MDM      Patient examined thoroughly.  Scalp laceration closure seems to be complicated by wound dehiscence and delayed healing.  I discussed the concern with the wounds appearance with the patient and recommend that we proceed with wound debridement to closer evaluate to ensure lack of infection at the deeper level of the tissues.  Patient agreed to proceed.  I anesthetized the area with 5 mL of 1% lidocaine with epinephrine with good anesthesia.  I then proceeded to gently debride the clot revealing very clean and healthy edges of the wound.  The wound is not approximated and a large hematoma noted underneath.  I again discussed the recommendation of her debriding this area and given beautiful tissue encountered underneath with no sign of infectious process and adequate hemostasis I recommended that we attempt performing closure today as her wound appears healthy enough to undertake that attempt.  Risks and benefits were discussed at length and the patient elected to proceed.    Procedure note:  Wound debridement and closure  After anesthetized as discussed above I proceeded to debride the wound removing large amount of clot.  Adequate hemostasis then achieved.  Small amount of devitalized tissue trimmed off with scissors wound was then closed with 7 horizontal mattress sutures using 4-0 Vicryl.  Wound approximated well.  Location of the wound: Scalp. length of wound: 3 cm    Ace wrap applied.   Patient will keep it on for the rest of the day and throughout the night and is instructed to remove it in the morning.  She is to return to the ER if bleeding through the Ace wrap were to be encountered or if any signs of infection were to develop.  She will take prophylactic antibiotics, specifically doxycycline.  Advised on photosensitivity risk.  We will have the patient recheck her wound in the emergency department on Tuesday or sooner if concerns were to develop.  Patient does not currently have a primary care physician.          Disposition and Plan     Clinical Impression:  1. Scalp laceration, subsequent encounter    2. Wound dehiscence         Disposition:  Discharge  6/1/2024  1:51 pm    Follow-up:  [facility] in [address] [address]  [phone]  Follow up in 3 day(s)  For wound re-check          Medications Prescribed:  Current Discharge Medication List        START taking these medications    Details   doxycycline 100 MG Oral Cap Take 1 capsule (100 mg total) by mouth 2 (two) times daily for 7 days.  Qty: 14 capsule, Refills: 0                                    Documentation created with the aid of Dragon voice recognition software.  Although efforts were made to ensure the accuracy of the note, some inaccuracies may persist.

## 2024-06-01 NOTE — PROGRESS NOTES
S: Patient reports to clinic for suture removal.  5 staples placed 10 days ago at ED s/p fall with LOC.    O: Laceration to scalp. 1 staple easily viewed.     PROCEDURE: suture removal.   LOCATION: scalp   WOUND EXAM:  No drainage, erythema, or signs of infection.  Incision cleansed with alcohol.  [place] difficult to visualize due to hair matting and thick scabbing. Able to remove to 4 staples.  The removed staples did not appear to be securing both sides of wound in place.  Wound cleanser used as well. No bleeding.   COMPLICATIONS: Matted hair/thick scabbing.    PATIENT STATUS:  Patient is reporting ongoing HA and dizziness as well.  HA rated 4-5/10.    NEURO: Alert & Oriented x 3.  CN II-XII intact. Moving all 4 extremities without difficulty.  No facial droop.  No slurred speech.  Patella & Brachioradialis DTR intact bilaterally.      A:Laceration, Dizziness  P: About 30 minutes spent using limited tools in WIC setting.  Unable to visualize 5th staple.  Wound likely needs to be thoroughly cleaned and irrigated to fully evaluate healing process.  Patient now also complaining of ongoing dizziness and HA.  HA rated 4-5/10.    Patient will be going to PED

## 2024-06-04 ENCOUNTER — HOSPITAL ENCOUNTER (EMERGENCY)
Age: 49
Discharge: HOME | End: 2024-06-04
Attending: EMERGENCY MEDICINE
Payer: MEDICAID

## 2024-06-04 VITALS
TEMPERATURE: 98 F | SYSTOLIC BLOOD PRESSURE: 130 MMHG | RESPIRATION RATE: 16 BRPM | BODY MASS INDEX: 29.99 KG/M2 | WEIGHT: 180 LBS | DIASTOLIC BLOOD PRESSURE: 76 MMHG | HEIGHT: 65 IN | HEART RATE: 96 BPM | OXYGEN SATURATION: 98 %

## 2024-06-04 DIAGNOSIS — Z51.89 VISIT FOR WOUND CHECK: Primary | ICD-10-CM

## 2024-06-04 PROCEDURE — 99282 EMERGENCY DEPT VISIT SF MDM: CPT

## 2024-06-04 NOTE — ED PROVIDER NOTES
Patient Seen in: [facility] In [place]      History     Chief Complaint   Patient presents with    Rash Skin Problem     Stated Complaint: wound check    Subjective:   HPI    48-year-old with a history of depression, hypertension, migraines presents for wound check.  Recent records reviewed.  She originally sustained a laceration to her scalp on .  She was seen on , approximately 20 hours past the injury.  The laceration was repaired and she was discharged on Keflex.  She returned on  and it was noted that 1 area of the laceration was still open.  It was repaired with sutures.  She was advised to return today for wound check prior to leaving on a prolonged trip to [place].  She denies any issues with the wound.  No trouble washing her hair.  Sutures are dissolvable.  Patient was seen 24 for upper respiratory symptoms and prescribed Tessalon and Augmentin  Objective:   Past Medical History:    ANXIETY    Anxiety state    CHICKEN POX    MIKE I (cervical intraepithelial neoplasia I)    DEPRESSION    postpartum on wellbutrin    Essential hypertension    FHx: migraine headaches    Gestational diabetes (HCC)    insulin controlled    High blood pressure    HPV in female    HYPERTENSION    IUD (intrauterine device) in place    Mirena Placed for Heavy Menses    Migraines    Mild dysplasia of cervix    on colpo    Visual impairment    readers              Past Surgical History:   Procedure Laterality Date          x1    Colonoscopy  2017    7 mm polyp (heavily cauterized, favoring hyperplastic), int hemorrhoids, repeat     Colonoscopy  2021    Left sided diverticulosis, Normal colon and TI mucosa. small int hemorrhoids    Colposcopy,bx cervix/endocerv curr  10/29/2010    MIKE 1    Colposcopy,bx cervix/endocerv curr  2012     MIKE 1    Correct bunion,othr methods      with bone rotation    Egd  2021    Mild gastritis, small hiatal hernia, nonobstructive  schatzki's ring s/p biopsies, normal duodenum          x 3    Tubal ligation                  Social History     Socioeconomic History    Marital status:    Tobacco Use    Smoking status: Never    Smokeless tobacco: Never   Vaping Use    Vaping status: Never Used   Substance and Sexual Activity    Alcohol use: Yes     Alcohol/week: 1.0 - 4.0 standard drink of alcohol     Types: 1 - 4 Shots of liquor per week    Drug use: No    Sexual activity: Yes     Partners: Male     Birth control/protection: Tubal Ligation, Vasectomy     Social Determinants of Health      Received from [facility]              Review of Systems    Positive for stated complaint: wound check  Other systems are as noted in HPI.  Constitutional and vital signs reviewed.      All other systems reviewed and negative except as noted above.    Physical Exam     ED Triage Vitals [24 0959]   /76   Pulse 96   Resp 16   Temp 98.2 °F (36.8 °C)   Temp src Temporal   SpO2 98 %   O2 Device None (Room air)       Current Vitals:   Vital Signs  BP: 130/76  Pulse: 96  Resp: 16  Temp: 98.2 °F (36.8 °C)  Temp src: Temporal    Oxygen Therapy  SpO2: 98 %  O2 Device: None (Room air)            Physical Exam    General: Patient is awake, alert in no acute distress.   HEENT/skin: Sutures noted and a healing scalp laceration without erythema, induration, tenderness, expressible purulence or swelling    ED Course   Labs Reviewed - No data to display                   MDM      Previous records reviewed as noted in HPI    Differential includes, but is not limited to, cellulitis, necrotizing fasciitis, wound dehiscence    Shared decision making with the patient.  Exam consistent with normal healing no evidence of infection or bleeding                           [facility]    Disposition and Plan     Clinical Impression:  1. Visit for wound check         Disposition:  Discharge  2024 10:05 am    Follow-up:  [facility] in  [place]  [address] [place] [id number]  [phone]  Follow up  As needed          Medications Prescribed:  Current Discharge Medication List

## 2024-12-13 ENCOUNTER — OFFICE VISIT (OUTPATIENT)
Dept: UROLOGY | Facility: CLINIC | Age: 49
End: 2024-12-13
Attending: OBSTETRICS & GYNECOLOGY
Payer: COMMERCIAL

## 2024-12-13 VITALS — WEIGHT: 190 LBS | HEIGHT: 65 IN | RESPIRATION RATE: 18 BRPM | TEMPERATURE: 98 F | BODY MASS INDEX: 31.65 KG/M2

## 2024-12-13 DIAGNOSIS — N39.3 FEMALE STRESS INCONTINENCE: Primary | ICD-10-CM

## 2024-12-13 DIAGNOSIS — R35.0 URINARY FREQUENCY: ICD-10-CM

## 2024-12-13 PROCEDURE — 99212 OFFICE O/P EST SF 10 MIN: CPT

## 2025-01-13 ENCOUNTER — HOSPITAL ENCOUNTER (OUTPATIENT)
Facility: HOSPITAL | Age: 50
Setting detail: OBSERVATION
Discharge: HOME | End: 2025-01-14
Attending: EMERGENCY MEDICINE | Admitting: HOSPITALIST
Payer: COMMERCIAL

## 2025-01-13 ENCOUNTER — APPOINTMENT (OUTPATIENT)
Dept: CT IMAGING | Facility: HOSPITAL | Age: 50
End: 2025-01-13
Attending: EMERGENCY MEDICINE
Payer: COMMERCIAL

## 2025-01-13 DIAGNOSIS — K74.60 CIRRHOSIS OF LIVER WITHOUT ASCITES, UNSPECIFIED HEPATIC CIRRHOSIS TYPE (HCC): ICD-10-CM

## 2025-01-13 DIAGNOSIS — N39.0 URINARY TRACT INFECTION WITHOUT HEMATURIA, SITE UNSPECIFIED: Primary | ICD-10-CM

## 2025-01-13 LAB
ALBUMIN SERPL-MCNC: 4.3 G/DL (ref 3.2–4.8)
ALBUMIN/GLOB SERPL: 1.4 {RATIO} (ref 1–2)
ALP LIVER SERPL-CCNC: 356 U/L
ALT SERPL-CCNC: 26 U/L
ANION GAP SERPL CALC-SCNC: 12 MMOL/L (ref 0–18)
AST SERPL-CCNC: 176 U/L (ref ?–34)
BASOPHILS # BLD AUTO: 0.03 X10(3) UL (ref 0–0.2)
BASOPHILS NFR BLD AUTO: 0.5 %
BILIRUB SERPL-MCNC: 6.1 MG/DL (ref 0.3–1.2)
BILIRUB UR QL CFM: NEGATIVE
BUN BLD-MCNC: 5 MG/DL (ref 9–23)
CALCIUM BLD-MCNC: 9.6 MG/DL (ref 8.7–10.4)
CHLORIDE SERPL-SCNC: 90 MMOL/L (ref 98–112)
CO2 SERPL-SCNC: 26 MMOL/L (ref 21–32)
COLOR UR AUTO: YELLOW
CREAT BLD-MCNC: 0.9 MG/DL
EGFRCR SERPLBLD CKD-EPI 2021: 78 ML/MIN/1.73M2 (ref 60–?)
EOSINOPHIL # BLD AUTO: 0.03 X10(3) UL (ref 0–0.7)
EOSINOPHIL NFR BLD AUTO: 0.5 %
ERYTHROCYTE [DISTWIDTH] IN BLOOD BY AUTOMATED COUNT: 21.4 %
FLUAV + FLUBV RNA SPEC NAA+PROBE: NEGATIVE
FLUAV + FLUBV RNA SPEC NAA+PROBE: NEGATIVE
GLOBULIN PLAS-MCNC: 3.1 G/DL (ref 2–3.5)
GLUCOSE BLD-MCNC: 122 MG/DL (ref 70–99)
GLUCOSE UR STRIP.AUTO-MCNC: NORMAL MG/DL
HCT VFR BLD AUTO: 25.1 %
HGB BLD-MCNC: 8.9 G/DL
IMM GRANULOCYTES # BLD AUTO: 0.04 X10(3) UL (ref 0–1)
IMM GRANULOCYTES NFR BLD: 0.6 %
INR BLD: 1.27 (ref 0.8–1.2)
KETONES UR STRIP.AUTO-MCNC: NEGATIVE MG/DL
LACTATE SERPL-SCNC: 1 MMOL/L (ref 0.5–2)
LACTATE SERPL-SCNC: 2.1 MMOL/L (ref 0.5–2)
LEUKOCYTE ESTERASE UR QL STRIP.AUTO: 250
LIPASE SERPL-CCNC: 38 U/L (ref 12–53)
LYMPHOCYTES # BLD AUTO: 0.75 X10(3) UL (ref 1–4)
LYMPHOCYTES NFR BLD AUTO: 11.8 %
MCH RBC QN AUTO: 36.8 PG (ref 26–34)
MCHC RBC AUTO-ENTMCNC: 35.5 G/DL (ref 31–37)
MCV RBC AUTO: 103.7 FL
MONOCYTES # BLD AUTO: 0.99 X10(3) UL (ref 0.1–1)
MONOCYTES NFR BLD AUTO: 15.5 %
NEUTROPHILS # BLD AUTO: 4.53 X10 (3) UL (ref 1.5–7.7)
NEUTROPHILS # BLD AUTO: 4.53 X10(3) UL (ref 1.5–7.7)
NEUTROPHILS NFR BLD AUTO: 71.1 %
NITRITE UR QL STRIP.AUTO: NEGATIVE
OSMOLALITY SERPL CALC.SUM OF ELEC: 265 MOSM/KG (ref 275–295)
PH UR STRIP.AUTO: 7.5 [PH] (ref 5–8)
PLATELET # BLD AUTO: 135 10(3)UL (ref 150–450)
POTASSIUM SERPL-SCNC: 3.2 MMOL/L (ref 3.5–5.1)
PROT SERPL-MCNC: 7.4 G/DL (ref 5.7–8.2)
PROT UR STRIP.AUTO-MCNC: NEGATIVE MG/DL
PROTHROMBIN TIME: 16 SECONDS (ref 11.6–14.8)
RBC # BLD AUTO: 2.42 X10(6)UL
RBC UR QL AUTO: NEGATIVE
RSV RNA SPEC NAA+PROBE: NEGATIVE
SARS-COV-2 RNA RESP QL NAA+PROBE: NOT DETECTED
SODIUM SERPL-SCNC: 128 MMOL/L (ref 136–145)
SP GR UR STRIP.AUTO: 1.01 (ref 1–1.03)
UROBILINOGEN UR STRIP.AUTO-MCNC: 4 MG/DL
WBC # BLD AUTO: 6.4 X10(3) UL (ref 4–11)
WBC #/AREA URNS AUTO: >50 /HPF

## 2025-01-13 PROCEDURE — 74177 CT ABD & PELVIS W/CONTRAST: CPT | Performed by: EMERGENCY MEDICINE

## 2025-01-13 PROCEDURE — 99222 1ST HOSP IP/OBS MODERATE 55: CPT | Performed by: HOSPITALIST

## 2025-01-13 RX ORDER — ALPRAZOLAM 0.5 MG
0.25 TABLET ORAL NIGHTLY PRN
Status: DISCONTINUED | OUTPATIENT
Start: 2025-01-13 | End: 2025-01-14

## 2025-01-13 RX ORDER — LOSARTAN POTASSIUM 100 MG/1
100 TABLET ORAL DAILY
Status: DISCONTINUED | OUTPATIENT
Start: 2025-01-14 | End: 2025-01-14

## 2025-01-13 RX ORDER — ONDANSETRON 2 MG/ML
4 INJECTION INTRAMUSCULAR; INTRAVENOUS EVERY 4 HOURS PRN
Status: ACTIVE | OUTPATIENT
Start: 2025-01-13 | End: 2025-01-13

## 2025-01-13 RX ORDER — METOCLOPRAMIDE 10 MG/1
10 TABLET ORAL 3 TIMES DAILY PRN
COMMUNITY

## 2025-01-13 RX ORDER — PANTOPRAZOLE SODIUM 20 MG/1
20 TABLET, DELAYED RELEASE ORAL
Status: DISCONTINUED | OUTPATIENT
Start: 2025-01-14 | End: 2025-01-14

## 2025-01-13 RX ORDER — ONDANSETRON 2 MG/ML
4 INJECTION INTRAMUSCULAR; INTRAVENOUS ONCE
Status: COMPLETED | OUTPATIENT
Start: 2025-01-13 | End: 2025-01-13

## 2025-01-13 RX ORDER — 0.9 % SODIUM CHLORIDE 0.9 %
INTRAVENOUS SOLUTION INTRAVENOUS
Status: DISPENSED
Start: 2025-01-13 | End: 2025-01-14

## 2025-01-13 RX ORDER — ALPRAZOLAM 0.25 MG/1
1 TABLET ORAL NIGHTLY PRN
COMMUNITY
Start: 2024-09-11

## 2025-01-13 RX ORDER — POTASSIUM CHLORIDE 1500 MG/1
40 TABLET, EXTENDED RELEASE ORAL EVERY 4 HOURS
Status: COMPLETED | OUTPATIENT
Start: 2025-01-13 | End: 2025-01-13

## 2025-01-13 RX ORDER — LEVOCETIRIZINE DIHYDROCHLORIDE 5 MG/1
5 TABLET, FILM COATED ORAL EVERY MORNING
COMMUNITY

## 2025-01-13 NOTE — H&P
[facility]     Cleopatra Miller Patient Status:  Emergency    1975 MRN [id number]   Location [facility] EMERGENCY DEPARTMENT Attending Sherif Mendoza*   Hosp Day # 0 PCP None Pcp     Chief Complaint: Cough shortness of breath abdominal pain fever    Subjective:    History of Present Illness:     Cleopatra Miller is a 49 year old female with history of anxiety depression hypertension migraine headache presents to emergency room to be evaluated for fever abdominal pain not feeling well.  She was initially seen at an urgent care for fever and cough and now she has new symptoms of abdominal pain and fever.  She is complaining of jaundice as well.  Her platelet count is 135 hemoglobin 8.9 .7.  Sodium level 128 potassium 3.2 alk phos 356  bilirubin 6.1.  History/Other:    Past Medical History:  Past Medical History:    ANXIETY    Anxiety state    CHICKEN POX    MIKE I (cervical intraepithelial neoplasia I)    DEPRESSION    postpartum on wellbutrin    Essential hypertension    FHx: migraine headaches    Gestational diabetes (HCC)    insulin controlled    High blood pressure    HPV in female    HYPERTENSION    IUD (intrauterine device) in place    Mirena Placed for Heavy Menses    Migraines    Mild dysplasia of cervix    on colpo    Visual impairment    readers     Past Surgical History:   Past Surgical History:   Procedure Laterality Date          x1    Colonoscopy  2017    7 mm polyp (heavily cauterized, favoring hyperplastic), int hemorrhoids, repeat     Colonoscopy  2021    Left sided diverticulosis, Normal colon and TI mucosa. small int hemorrhoids    Colposcopy,bx cervix/endocerv curr  10/29/2010    MIKE 1    Colposcopy,bx cervix/endocerv curr  2012     MIKE 1    Correct bunion,othr methods      with bone rotation    Egd  2021    Mild gastritis, small hiatal hernia, nonobstructive schatzki's ring s/p biopsies, normal  duodenum          x 3    Outer ear surgery proc unlisted      Tubal ligation        Family History:   Family History   Problem Relation Age of Onset    Cancer Father         kidney,prostate,bladder    Diabetes Father     Hypertension Father     Hypertension Paternal Grandmother     Other (Other) Paternal Grandmother     Heart Disorder Maternal Grandmother     Other (Other) Maternal Grandmother     Heart Disorder Maternal Grandfather         MI    Other (Other) Paternal Grandfather         stroke    Breast Cancer Sister 50        Dx Breast CA age 50.    Other (Other) Sister         eczema, crohn's disease    Lipids Neg     Obesity Neg     Psychiatric Neg      Social History:    reports that she has never smoked. She has never used smokeless tobacco. She reports current alcohol use of about 1.0 - 4.0 standard drink of alcohol per week. She reports that she does not use drugs.     Allergies: Allergies[1]    Medications:  Medications Ordered Prior to Encounter[2]    Review of Systems:   A comprehensive review of systems was completed.    Pertinent positives and negatives noted in the HPI.    Objective:   Physical Exam:    /87   Pulse 91   Temp 96.8 °F (36 °C) (Temporal)   Resp 16   LMP  (LMP Unknown)   SpO2 99%   General: No acute distress, Alert  Respiratory: No rhonchi, no wheezes  Cardiovascular: S1, S2. Regular rate and rhythm  Abdomen: Soft, Non-tender, non-distended, positive bowel sounds  Neuro: No new focal deficits  Extremities: No edema      Results:    Labs:      Labs Last 24 Hours:    Recent Labs   Lab 25  1158   RBC 2.42*   HGB 8.9*   HCT 25.1*   .7*   MCH 36.8*   MCHC 35.5   RDW 21.4   NEPRELIM 4.53   WBC 6.4   .0*       Recent Labs   Lab 25  1158   *   BUN 5*   CREATSERUM 0.90   EGFRCR 78   CA 9.6   ALB 4.3   *   K 3.2*   CL 90*   CO2 26.0   ALKPHO 356*   *   ALT 26   BILT 6.1*   TP 7.4       Estimated Glomerular Filtration Rate: 78  mL/min/1.73m2 (by CKD-EPI based on SCr of 0.9 mg/dL).    Lab Results   Component Value Date    INR 1.27 (H) 01/13/2025    INR 1.0 04/06/2021       No results for input(s): \"TROP\", \"TROPHS\", \"CK\" in the last 168 hours.    No results for input(s): \"TROP\", \"PBNP\" in the last 168 hours.    No results for input(s): \"PCT\" in the last 168 hours.    Imaging: Imaging data reviewed in Epic.    Assessment & Plan:      # UTI  -Continue ceftriaxone started in emergency room  -Follow urine and blood cultures    # History of alcoholic hepatitis and fatty liver disease  -Drinking alcohol 6 months ago  -Used to follow-up with liver specialist from [facility] and liver biopsy was offered  -Endoscopy done October 2023 showed mild portal hypertensive gastropathy and endoscopic ultrasound showed hepatic steatosis     Plan of care discussed with patient and emergency room physician      Sarah Kimball MD    Supplementary Documentation:     The 21st Century Cures Act makes medical notes like these available to patients in the interest of transparency. Please be advised this is a medical document. Medical documents are intended to carry relevant information, facts as evident, and the clinical opinion of the practitioner. The medical note is intended as peer to peer communication and may appear blunt or direct. It is written in medical language and may contain abbreviations or verbiage that are unfamiliar.                                       [1]   Allergies  Allergen Reactions    Amlodipine SWELLING    Ace Inhibitors Coughing   [2]   No current facility-administered medications on file prior to encounter.     Current Outpatient Medications on File Prior to Encounter   Medication Sig Dispense Refill    levocetirizine 5 MG Oral Tab Take 1 tablet (5 mg total) by mouth every morning.      ALPRAZolam 0.25 MG Oral Tab Take 1 tablet (0.25 mg total) by mouth nightly as needed.      metoclopramide 10 MG Oral Tab Take 1 tablet (10 mg total) by mouth 3  (three) times daily as needed (nausea).      ondansetron (ZOFRAN) 4 mg tablet Take 1 tablet (4 mg total) by mouth every 8 (eight) hours as needed for Nausea.      LOSARTAN 100 MG Oral Tab TAKE 1 TABLET BY MOUTH ONCE DAILY 90 tablet 0    OMEPRAZOLE 20 MG Oral Capsule Delayed Release TAKE ONE CAPSULE BY MOUTH ONCE DAILY 90 capsule 0

## 2025-01-13 NOTE — ED INITIAL ASSESSMENT (HPI)
Patient reports c/o congestion, cough and multiple abnormal labs and urine this am.Patient also states palpable abdominal pain.

## 2025-01-13 NOTE — ED PROVIDER NOTES
Patient Seen in: [facility]      History     Chief Complaint   Patient presents with    Abnormal Result     Stated Complaint: uti    Subjective:   HPI      49-year-old female presents today for evaluation.  Several days ago, patient had a cough along with fevers of a Tmax of 101 Fahrenheit.  She denies any Tylenol or ibuprofen intake.  Over the last day, she noted her right upper quadrant abdominal pain along with abdominal bloating.  She also noted a change in the appearance of her urine.  She was seen at an immediate care and advised to come to the ER for evaluation.    Objective:     Past Medical History:    ANXIETY    Anxiety state    CHICKEN POX    MIKE I (cervical intraepithelial neoplasia I)    DEPRESSION    postpartum on wellbutrin    Essential hypertension    FHx: migraine headaches    Gestational diabetes (HCC)    insulin controlled    High blood pressure    HPV in female    HYPERTENSION    IUD (intrauterine device) in place    Mirena Placed for Heavy Menses    Migraines    Mild dysplasia of cervix    on colpo    Visual impairment    readers              Past Surgical History:   Procedure Laterality Date          x1    Colonoscopy  2017    7 mm polyp (heavily cauterized, favoring hyperplastic), int hemorrhoids, repeat     Colonoscopy  2021    Left sided diverticulosis, Normal colon and TI mucosa. small int hemorrhoids    Colposcopy,bx cervix/endocerv curr  10/29/2010    MIKE 1    Colposcopy,bx cervix/endocerv curr  2012     MIKE 1    Correct bunion,othr methods      with bone rotation    Egd  2021    Mild gastritis, small hiatal hernia, nonobstructive schatzki's ring s/p biopsies, normal duodenum          x 3    Outer ear surgery proc unlisted      Tubal ligation                  Social History     Socioeconomic History    Marital status:    Tobacco Use    Smoking status: Never    Smokeless tobacco: Never   Vaping Use    Vaping  status: Never Used   Substance and Sexual Activity    Alcohol use: Yes     Alcohol/week: 1.0 - 4.0 standard drink of alcohol     Types: 1 - 4 Shots of liquor per week    Drug use: No    Sexual activity: Yes     Partners: Male     Birth control/protection: Tubal Ligation, Vasectomy     Social Drivers of Health      [facility]                  Physical Exam     ED Triage Vitals [01/13/25 1121]   BP (!) 152/100   Pulse 87   Resp 16   Temp 96.8 °F (36 °C)   Temp src Temporal   SpO2 100 %   O2 Device None (Room air)       Current Vitals:   Vital Signs  BP: (!) 151/91  Pulse: 95  Resp: 16  Temp: 96.8 °F (36 °C)  Temp src: Temporal  MAP (mmHg): (!) 110    Oxygen Therapy  SpO2: 100 %  O2 Device: None (Room air)        Physical Exam  Vitals and nursing note reviewed.   Constitutional:       Appearance: Normal appearance.   HENT:      Head: Normocephalic.      Nose: Nose normal.      Mouth/Throat:      Mouth: Mucous membranes are moist.   Eyes:      General: Scleral icterus present.      Extraocular Movements: Extraocular movements intact.   Cardiovascular:      Rate and Rhythm: Normal rate.   Pulmonary:      Effort: Pulmonary effort is normal.   Abdominal:      General: Abdomen is flat. There is distension.      Tenderness: There is no guarding or rebound.      Comments: Mild right upper quadrant tenderness with hepatomegaly   Musculoskeletal:         General: Normal range of motion.   Skin:     General: Skin is warm.      Coloration: Skin is jaundiced.   Neurological:      General: No focal deficit present.      Mental Status: She is alert.   Psychiatric:         Mood and Affect: Mood normal.         ED Course     Labs Reviewed   CBC WITH DIFFERENTIAL WITH PLATELET - Abnormal; Notable for the following components:       Result Value    RBC 2.42 (*)     HGB 8.9 (*)     HCT 25.1 (*)     .0 (*)     .7 (*)     MCH 36.8 (*)     Lymphocyte Absolute 0.75 (*)     All other  components within normal limits   COMP METABOLIC PANEL (14) - Abnormal; Notable for the following components:    Glucose 122 (*)     Sodium 128 (*)     Potassium 3.2 (*)     Chloride 90 (*)     BUN 5 (*)     Calculated Osmolality 265 (*)      (*)     Alkaline Phosphatase 356 (*)     Bilirubin, Total 6.1 (*)     All other components within normal limits   URINALYSIS WITH CULTURE REFLEX - Abnormal; Notable for the following components:    Clarity Urine Turbid (*)     Urobilinogen Urine 4 (*)     Leukocyte Esterase Urine 250 (*)     WBC Urine >50 (*)     RBC Urine 3-5 (*)     Bacteria Urine 1+ (*)     Squamous Epi. Cells Few (*)     All other components within normal limits   PROTHROMBIN TIME (PT) - Abnormal; Notable for the following components:    PT 16.0 (*)     INR 1.27 (*)     All other components within normal limits   LACTIC ACID, PLASMA - Abnormal; Notable for the following components:    Lactic Acid 2.1 (*)     All other components within normal limits   LIPASE - Normal   ICTOTEST - Normal   SARS-COV-2/FLU A AND B/RSV BY PCR (GENEXPERT) - Normal    Narrative:     This test is intended for the qualitative detection and differentiation of SARS-CoV-2, influenza A, influenza B, and respiratory syncytial virus (RSV) viral RNA in nasopharyngeal or nares swabs from individuals suspected of respiratory viral infection consistent with COVID-19 by their healthcare provider. Signs and symptoms of respiratory viral infection due to SARS-CoV-2, influenza, and RSV can be similar.    Test performed using the Xpert Xpress SARS-CoV-2/FLU/RSV (real time RT-PCR)  assay on the GeneXpert instrument, [address], [place] [address].   This test is being used under the Food and Drug Administration's Emergency Use Authorization.    The authorized Fact Sheet for Healthcare Providers for this assay is available upon request from the laboratory.   LACTIC ACID REFLEX POST POSTIVE   BLOOD CULTURE   BLOOD CULTURE   URINE CULTURE,  ROUTINE            CT ABDOMEN+PELVIS(CONTRAST ONLY)(CPT=74177)    Result Date: 1/13/2025  PROCEDURE:  CT ABDOMEN+PELVIS (CONTRAST ONLY) (CPT=74177)  COMPARISON:  [facility], CT, CT ABDOMEN PELVIS IV CONTRAST, NO ORAL (ER), 12/14/2021, 9:59 PM.  INDICATIONS:  Urinary tract infection, difficulty urinating.  TECHNIQUE:  CT scanning was performed from the dome of the diaphragm to the pubic symphysis with non-ionic intravenous contrast material. Post contrast coronal MPR imaging was performed.  Dose reduction techniques were used. Dose information is transmitted to the ACR (American College of Radiology) NRDR (National Radiology Data Registry) which includes the Dose Index Registry. CONTRAST USED:  100cc of Isovue 370  FINDINGS: LUNG BASE:  Unremarkable. LIVER:  Diffuse fatty infiltration of the liver.  Lobulated contours and heterogeneity of the liver may be related to underlying cirrhosis.  Hepatomegaly measuring up to 23.4 cm craniocaudal dimension. BILIARY:  Unremarkable. SPLEEN:  Splenomegaly measuring up to 16.9 cm in craniocaudal dimension.  Accessory spleen noted. PANCREAS:  Unremarkable. ADRENALS:  Unremarkable. KIDNEYS:  Unremarkable. AORTA/VASCULAR:  Mild mixed plaque in the aorta and iliac arteries. RETROPERITONEUM:  Unremarkable. BOWEL/MESENTERY:  Unremarkable appendix.  Scattered feces in the colon.  No large or small bowel dilatation.  Minimal free fluid in the pelvis. ABDOMINAL WALL:  Unremarkable. PELVIC ORGANS:  Urinary bladder wall thickening with mild fatty induration is concerning for cystitis.  Clinical correlation recommended.  Unremarkable uterus and ovaries. LYMPH NODES:  No lymphadenopathy in the abdomen or pelvis. BONES:  Degenerative changes in the spine and hips. OTHER:  None.            CONCLUSION:   1. Findings concerning for cystitis.  Clinical correlation recommended.  2. Hepatosplenomegaly with diffuse fatty infiltration of the liver and probable changes of cirrhosis in the liver.  3.  Minimal free fluid in the pelvis.   Please see above for further details.  LOCATION:  [id number]   Dictated by (CST): Nj Feldman MD on 1/13/2025 at 1:50 PM     Finalized by (CST): Nj Feldman MD on 1/13/2025 at 1:54 PM             [facility]      Differential Diagnosis  49-year-old female presents today for evaluation of several days of fevers and cough now with changes to the appearance of her urine.  At the immediate care, she had a urine that was concerning for possible UTI.  She did take Azo this morning.  Additionally, her bilirubin was noted to be 5.9.  She appears jaundiced on my assessment.  She has some right upper quadrant tenderness and her abdomen does appear distended.  Patient has a history of drinking alcohol in the past, and states she has been sober since October 15.  Differential would include cirrhosis, choledocholithiasis, cholangitis, SBP.  Plan for CT along with labs, will reassess.    2:26 pm  Urine and imaging suggestive of a UTI, IV antibiotics ordered.  She is also anemic, thrombocytopenic with an elevated INR and bilirubin.  Findings and imaging are concerning for cirrhosis.  Will admit to the hospital for further evaluation.  I spoke with the hospitalist in regards to admission.  Patient updated on findings and agreeable to admission.    External Chart Reviewed  I reviewed the immediate care documentation along with laboratory findings from earlier today    Discussions of Management    I spoke with the hospitalist in regards to admission.     Admission disposition: 1/13/2025  2:25 PM           Medical Decision Making      Disposition and Plan     Clinical Impression:  1. Urinary tract infection without hematuria, site unspecified    2. Cirrhosis of liver without ascites, unspecified hepatic cirrhosis type (HCC)         Disposition:  Admit  1/13/2025  2:25 pm    Follow-up:  No follow-up provider specified.        Medications Prescribed:  Current Discharge Medication List               Supplementary Documentation:         Hospital Problems       Present on Admission  Date Reviewed: 6/1/2024            ICD-10-CM Noted POA    * (Principal) Urinary tract infection without hematuria, site unspecified N39.0 1/13/2025 Unknown

## 2025-01-14 VITALS
BODY MASS INDEX: 31 KG/M2 | OXYGEN SATURATION: 99 % | WEIGHT: 185 LBS | DIASTOLIC BLOOD PRESSURE: 73 MMHG | TEMPERATURE: 99 F | SYSTOLIC BLOOD PRESSURE: 118 MMHG | RESPIRATION RATE: 20 BRPM | HEART RATE: 86 BPM

## 2025-01-14 PROBLEM — E87.1 HYPONATREMIA: Status: ACTIVE | Noted: 2025-01-14

## 2025-01-14 LAB
ALBUMIN SERPL-MCNC: 4.1 G/DL (ref 3.2–4.8)
ALBUMIN/GLOB SERPL: 1.5 {RATIO} (ref 1–2)
ALP LIVER SERPL-CCNC: 324 U/L
ALT SERPL-CCNC: 21 U/L
ANION GAP SERPL CALC-SCNC: 13 MMOL/L (ref 0–18)
ANION GAP SERPL CALC-SCNC: 9 MMOL/L (ref 0–18)
AST SERPL-CCNC: 149 U/L (ref ?–34)
BASOPHILS # BLD AUTO: 0.03 X10(3) UL (ref 0–0.2)
BASOPHILS NFR BLD AUTO: 0.5 %
BILIRUB SERPL-MCNC: 4.8 MG/DL (ref 0.3–1.2)
BUN BLD-MCNC: 6 MG/DL (ref 9–23)
BUN BLD-MCNC: <5 MG/DL (ref 9–23)
CALCIUM BLD-MCNC: 9.4 MG/DL (ref 8.7–10.6)
CALCIUM BLD-MCNC: 9.5 MG/DL (ref 8.7–10.6)
CHLORIDE SERPL-SCNC: 91 MMOL/L (ref 98–112)
CHLORIDE SERPL-SCNC: 94 MMOL/L (ref 98–112)
CO2 SERPL-SCNC: 24 MMOL/L (ref 21–32)
CO2 SERPL-SCNC: 24 MMOL/L (ref 21–32)
CREAT BLD-MCNC: 0.81 MG/DL
CREAT BLD-MCNC: 0.85 MG/DL
EGFRCR SERPLBLD CKD-EPI 2021: 83 ML/MIN/1.73M2 (ref 60–?)
EGFRCR SERPLBLD CKD-EPI 2021: 88 ML/MIN/1.73M2 (ref 60–?)
EOSINOPHIL # BLD AUTO: 0.05 X10(3) UL (ref 0–0.7)
EOSINOPHIL NFR BLD AUTO: 0.8 %
ERYTHROCYTE [DISTWIDTH] IN BLOOD BY AUTOMATED COUNT: 22.1 %
FOLATE SERPL-MCNC: 5.7 NG/ML (ref 5.4–?)
GLOBULIN PLAS-MCNC: 2.8 G/DL (ref 2–3.5)
GLUCOSE BLD-MCNC: 119 MG/DL (ref 70–99)
GLUCOSE BLD-MCNC: 143 MG/DL (ref 70–99)
HCT VFR BLD AUTO: 24.8 %
HGB BLD-MCNC: 8.8 G/DL
IMM GRANULOCYTES # BLD AUTO: 0.04 X10(3) UL (ref 0–1)
IMM GRANULOCYTES NFR BLD: 0.7 %
LYMPHOCYTES # BLD AUTO: 0.79 X10(3) UL (ref 1–4)
LYMPHOCYTES NFR BLD AUTO: 13.4 %
MCH RBC QN AUTO: 36.7 PG (ref 26–34)
MCHC RBC AUTO-ENTMCNC: 35.5 G/DL (ref 31–37)
MCV RBC AUTO: 103.3 FL
MONOCYTES # BLD AUTO: 1.2 X10(3) UL (ref 0.1–1)
MONOCYTES NFR BLD AUTO: 20.3 %
NEUTROPHILS # BLD AUTO: 3.79 X10 (3) UL (ref 1.5–7.7)
NEUTROPHILS # BLD AUTO: 3.79 X10(3) UL (ref 1.5–7.7)
NEUTROPHILS NFR BLD AUTO: 64.3 %
OSMOLALITY SERPL CALC.SUM OF ELEC: 265 MOSM/KG (ref 275–295)
PLATELET # BLD AUTO: 141 10(3)UL (ref 150–450)
PLATELET MORPHOLOGY: NORMAL
POTASSIUM SERPL-SCNC: 3.8 MMOL/L (ref 3.5–5.1)
POTASSIUM SERPL-SCNC: 4 MMOL/L (ref 3.5–5.1)
POTASSIUM SERPL-SCNC: 4.5 MMOL/L (ref 3.5–5.1)
PROT SERPL-MCNC: 6.9 G/DL (ref 5.7–8.2)
RBC # BLD AUTO: 2.4 X10(6)UL
SODIUM SERPL-SCNC: 127 MMOL/L (ref 136–145)
SODIUM SERPL-SCNC: 128 MMOL/L (ref 136–145)
VIT B12 SERPL-MCNC: 645 PG/ML (ref 211–911)
WBC # BLD AUTO: 5.9 X10(3) UL (ref 4–11)

## 2025-01-14 PROCEDURE — 99239 HOSP IP/OBS DSCHRG MGMT >30: CPT | Performed by: HOSPITALIST

## 2025-01-14 RX ORDER — CETIRIZINE HYDROCHLORIDE 5 MG/1
5 TABLET ORAL DAILY
Status: DISCONTINUED | OUTPATIENT
Start: 2025-01-14 | End: 2025-01-14

## 2025-01-14 RX ORDER — FUROSEMIDE 10 MG/ML
40 INJECTION INTRAMUSCULAR; INTRAVENOUS ONCE
Status: COMPLETED | OUTPATIENT
Start: 2025-01-14 | End: 2025-01-14

## 2025-01-14 RX ORDER — CEFADROXIL 500 MG/1
500 CAPSULE ORAL 2 TIMES DAILY
Qty: 10 CAPSULE | Refills: 0 | Status: SHIPPED | OUTPATIENT
Start: 2025-01-14 | End: 2025-01-19

## 2025-01-14 NOTE — PLAN OF CARE
Alert and oriented x4. Normotensive, afebrile.   Up to void.  Denies pain.  PRN sleep aid and Xanax per order.  Potassium replaced per protocol.   Regular diet, tolerating without nausea.  Saline locked.   Plan of care: Continue IV antibiotics per MAR, await cultures, trend labs

## 2025-01-14 NOTE — PAYOR COMM NOTE
--------------  ADMISSION REVIEW     Payor: [employer]/HMO/POS/EPO  Subscriber #:  [id number]  Authorization Number: [id number]    Admit date: 1/13/25  Admit time:  6:12 PM       REVIEW DOCUMENTATION:     ED Provider Notes        ED Provider Notes signed by Valeriy Mendoza MD at 1/13/2025  2:28 PM        Patient Seen in: [facility] Emergency Department      History     Chief Complaint   Patient presents with    Abnormal Result     Stated Complaint: uti    Subjective:   HPI      49-year-old female presents today for evaluation.  Several days ago, patient had a cough along with fevers of a Tmax of 101 Fahrenheit.  She denies any Tylenol or ibuprofen intake.  Over the last day, she noted her right upper quadrant abdominal pain along with abdominal bloating.  She also noted a change in the appearance of her urine.  She was seen at an immediate care and advised to come to the ER for evaluation.    Objective:     Past Medical History:    ANXIETY    Anxiety state    CHICKEN POX    MIKE I (cervical intraepithelial neoplasia I)    DEPRESSION    postpartum on wellbutrin    Essential hypertension    FHx: migraine headaches    Gestational diabetes (HCC)    insulin controlled    High blood pressure    HPV in female    HYPERTENSION    IUD (intrauterine device) in place    Mirena Placed for Heavy Menses    Migraines    Mild dysplasia of cervix    on colpo    Visual impairment    readers     Physical Exam     ED Triage Vitals [01/13/25 1121]   BP (!) 152/100   Pulse 87   Resp 16   Temp 96.8 °F (36 °C)   Temp src Temporal   SpO2 100 %   O2 Device None (Room air)       Current Vitals:   Vital Signs  BP: (!) 151/91  Pulse: 95  Resp: 16  Temp: 96.8 °F (36 °C)  Temp src: Temporal  MAP (mmHg): (!) 110    Oxygen Therapy  SpO2: 100 %  O2 Device: None (Room air)        Physical Exam  Vitals and nursing note reviewed.   Constitutional:       Appearance: Normal appearance.   HENT:      Head: Normocephalic.       Nose: Nose normal.      Mouth/Throat:      Mouth: Mucous membranes are moist.   Eyes:      General: Scleral icterus present.      Extraocular Movements: Extraocular movements intact.   Cardiovascular:      Rate and Rhythm: Normal rate.   Pulmonary:      Effort: Pulmonary effort is normal.   Abdominal:      General: Abdomen is flat. There is distension.      Tenderness: There is no guarding or rebound.      Comments: Mild right upper quadrant tenderness with hepatomegaly   Musculoskeletal:         General: Normal range of motion.   Skin:     General: Skin is warm.      Coloration: Skin is jaundiced.   Neurological:      General: No focal deficit present.      Mental Status: She is alert.   Psychiatric:         Mood and Affect: Mood normal.         ED Course     Labs Reviewed   CBC WITH DIFFERENTIAL WITH PLATELET - Abnormal; Notable for the following components:       Result Value    RBC 2.42 (*)     HGB 8.9 (*)     HCT 25.1 (*)     .0 (*)     .7 (*)     MCH 36.8 (*)     Lymphocyte Absolute 0.75 (*)     All other components within normal limits   COMP METABOLIC PANEL (14) - Abnormal; Notable for the following components:    Glucose 122 (*)     Sodium 128 (*)     Potassium 3.2 (*)     Chloride 90 (*)     BUN 5 (*)     Calculated Osmolality 265 (*)      (*)     Alkaline Phosphatase 356 (*)     Bilirubin, Total 6.1 (*)     All other components within normal limits   URINALYSIS WITH CULTURE REFLEX - Abnormal; Notable for the following components:    Clarity Urine Turbid (*)     Urobilinogen Urine 4 (*)     Leukocyte Esterase Urine 250 (*)     WBC Urine >50 (*)     RBC Urine 3-5 (*)     Bacteria Urine 1+ (*)     Squamous Epi. Cells Few (*)     All other components within normal limits   PROTHROMBIN TIME (PT) - Abnormal; Notable for the following components:    PT 16.0 (*)     INR 1.27 (*)     All other components within normal limits   LACTIC ACID, PLASMA - Abnormal; Notable for the following  components:    Lactic Acid 2.1 (*)     All other components within normal limits   LIPASE - Normal   ICTOTEST - Normal   SARS-COV-2/FLU A AND B/RSV BY PCR (GENEXPERT) - Normal    Narrative:     This test is intended for the qualitative detection and differentiation of SARS-CoV-2, influenza A, influenza B, and respiratory syncytial virus (RSV) viral RNA in nasopharyngeal or nares swabs from individuals suspected of respiratory viral infection consistent with COVID-19 by their healthcare provider. Signs and symptoms of respiratory viral infection due to SARS-CoV-2, influenza, and RSV can be similar.    Test performed using the Xpert Xpress SARS-CoV-2/FLU/RSV (real time RT-PCR)  assay on the GeneXpert instrument, [employer], [place] [address].   This test is being used under the Food and Drug Administration's Emergency Use Authorization.    The authorized Fact Sheet for Healthcare Providers for this assay is available upon request from the laboratory.   LACTIC ACID REFLEX POST POSTIVE   BLOOD CULTURE   BLOOD CULTURE   URINE CULTURE, ROUTINE            CT ABDOMEN+PELVIS(CONTRAST ONLY)(CPT=74177)    Result Date: 1/13/2025  PROCEDURE:  CT ABDOMEN+PELVIS (CONTRAST ONLY) (CPT=74177)  COMPARISON:  [facility], CT, CT ABDOMEN PELVIS IV CONTRAST, NO ORAL (ER), 12/14/2021, 9:59 PM.  INDICATIONS:  Urinary tract infection, difficulty urinating.  TECHNIQUE:  CT scanning was performed from the dome of the diaphragm to the pubic symphysis with non-ionic intravenous contrast material. Post contrast coronal MPR imaging was performed.  Dose reduction techniques were used. Dose information is transmitted to the ACR (American College of Radiology) NRDR (National Radiology Data Registry) which includes the Dose Index Registry. CONTRAST USED:  100cc of Isovue 370  FINDINGS: LUNG BASE:  Unremarkable. LIVER:  Diffuse fatty infiltration of the liver.  Lobulated contours and heterogeneity of the liver may be related to underlying cirrhosis.   Hepatomegaly measuring up to 23.4 cm craniocaudal dimension. BILIARY:  Unremarkable. SPLEEN:  Splenomegaly measuring up to 16.9 cm in craniocaudal dimension.  Accessory spleen noted. PANCREAS:  Unremarkable. ADRENALS:  Unremarkable. KIDNEYS:  Unremarkable. AORTA/VASCULAR:  Mild mixed plaque in the aorta and iliac arteries. RETROPERITONEUM:  Unremarkable. BOWEL/MESENTERY:  Unremarkable appendix.  Scattered feces in the colon.  No large or small bowel dilatation.  Minimal free fluid in the pelvis. ABDOMINAL WALL:  Unremarkable. PELVIC ORGANS:  Urinary bladder wall thickening with mild fatty induration is concerning for cystitis.  Clinical correlation recommended.  Unremarkable uterus and ovaries. LYMPH NODES:  No lymphadenopathy in the abdomen or pelvis. BONES:  Degenerative changes in the spine and hips. OTHER:  None.            CONCLUSION:   1. Findings concerning for cystitis.  Clinical correlation recommended.  2. Hepatosplenomegaly with diffuse fatty infiltration of the liver and probable changes of cirrhosis in the liver.  3. Minimal free fluid in the pelvis.   Please see above for further details.  LOCATION:  [facility]   Dictated by (CST): Nj Feldman MD on 1/13/2025 at 1:50 PM     Finalized by (CST): Nj Feldman MD on 1/13/2025 at 1:54 PM            MDM      Differential Diagnosis  49-year-old female presents today for evaluation of several days of fevers and cough now with changes to the appearance of her urine.  At the immediate care, she had a urine that was concerning for possible UTI.  She did take Azo this morning.  Additionally, her bilirubin was noted to be 5.9.  She appears jaundiced on my assessment.  She has some right upper quadrant tenderness and her abdomen does appear distended.  Patient has a history of drinking alcohol in the past, and states she has been sober since October 15.  Differential would include cirrhosis, choledocholithiasis, cholangitis, SBP.  Plan for CT along with labs,  will reassess.    2:26 pm  Urine and imaging suggestive of a UTI, IV antibiotics ordered.  She is also anemic, thrombocytopenic with an elevated INR and bilirubin.  Findings and imaging are concerning for cirrhosis.  Will admit to the hospital for further evaluation.  I spoke with the hospitalist in regards to admission.  Patient updated on findings and agreeable to admission.    External Chart Reviewed  I reviewed the immediate care documentation along with laboratory findings from earlier today    Discussions of Management    I spoke with the hospitalist in regards to admission.     Admission disposition: 1/13/2025  2:25 PM           Medical Decision Making      Disposition and Plan     Clinical Impression:  1. Urinary tract infection without hematuria, site unspecified    2. Cirrhosis of liver without ascites, unspecified hepatic cirrhosis type (HCC)         Disposition:  Admit  1/13/2025  2:25 pm     Hospital Problems       Present on Admission  Date Reviewed: 6/1/2024            ICD-10-CM Noted POA    * (Principal) Urinary tract infection without hematuria, site unspecified N39.0 1/13/2025 Unknown                Signed by Valeriy Mendoza MD on 1/13/2025  2:28 PM         History and Physical    H&P signed by Sarah Kimball MD at 1/13/2025  9:32 PM             Chief Complaint: Cough shortness of breath abdominal pain fever     Subjective:    History of Present Illness:      Cleopatra Miller is a 49 year old female with history of anxiety depression hypertension migraine headache presents to emergency room to be evaluated for fever abdominal pain not feeling well.  She was initially seen at an urgent care for fever and cough and now she has new symptoms of abdominal pain and fever.  She is complaining of jaundice as well.  Her platelet count is 135 hemoglobin 8.9 .7.  Sodium level 128 potassium 3.2 alk phos 356  bilirubin 6.1.    Assessment & Plan:       # UTI  -Continue ceftriaxone started  in emergency room  -Follow urine and blood cultures     # History of alcoholic hepatitis and fatty liver disease  -Drinking alcohol 6 months ago  -Used to follow-up with liver specialist from [facility] and liver biopsy was offered  -Endoscopy done October 2023 showed mild portal hypertensive gastropathy and endoscopic ultrasound showed hepatic steatosis      Plan of care discussed with patient and emergency room physician        Sarah Kimball MD              MEDICATIONS ADMINISTERED IN LAST 1 DAY:  ALPRAZolam (Xanax) tab 0.25 mg       Date Action Dose Route User    1/13/2025 2200 Given 0.25 mg Oral Flip Pope RN          cefTRIAXone (Rocephin) 2 g in sodium chloride 0.9% 100 mL IVPB-ADDV       Date Action Dose Route User    1/13/2025 1506 New Bag 2 g Intravenous Lalo Stephens RN          cetirizine (ZyrTEC) tab 5 mg       Date Action Dose Route User    1/14/2025 1021 Given 5 mg Oral Elissa Joy RN          furosemide (Lasix) 10 mg/mL injection 40 mg       Date Action Dose Route User    1/14/2025 1219 Given 40 mg Intravenous Elissa Joy RN          iopamidol 76% (ISOVUE-370) injection for power injector       Date Action Dose Route User    1/13/2025 1335 Given 100 mL Intravenous Spies, Carmelina          losartan (Cozaar) tab 100 mg       Date Action Dose Route User    1/14/2025 0839 Given 100 mg Oral Elissa Joy RN          melatonin tab 3 mg       Date Action Dose Route User    1/13/2025 2201 Given 3 mg Oral Flip Pope RN          ondansetron (Zofran) 4 MG/2ML injection 4 mg       Date Action Dose Route User    1/13/2025 1519 Given 4 mg Intravenous Lalo Stephens RN          pantoprazole (Protonix) DR tab 20 mg       Date Action Dose Route User    1/14/2025 0613 Given 20 mg Oral Flip Pope RN          potassium chloride (Klor-Con M20) tab 40 mEq       Date Action Dose Route User    1/13/2025 2201 Given 40 mEq Oral Filp Pope RN    1/13/2025 2035 Given 40  mEq Oral Niko, Flip Martinez, RN            Vitals (last day)       Date/Time Temp Pulse Resp BP SpO2 Weight O2 Device O2 Flow Rate (L/min) Who    01/14/25 1140 98.5 °F (36.9 °C) 82 20 131/94 99 % -- None (Room air) -- KS    01/14/25 0400 98.4 °F (36.9 °C) 78 18 105/63 -- -- None (Room air) -- HT    01/13/25 2030 99.2 °F (37.3 °C) 92 18 142/86 98 % -- None (Room air) -- HT    01/13/25 1833 -- -- -- -- -- 185 lb (83.9 kg) -- -- KG    01/13/25 1822 99.1 °F (37.3 °C) 99 17 133/89 99 % -- None (Room air) -- DN    01/13/25 1800 -- 93 -- 134/89 99 % -- None (Room air) -- TB    01/13/25 1530 -- 91 -- 136/87 99 % -- None (Room air) -- TB    01/13/25 1300 -- 95 -- -- 100 % -- None (Room air) -- MW    01/13/25 1200 -- 86 -- 151/91 100 % -- None (Room air) -- MW    01/13/25 1121 96.8 °F (36 °C) 87 16 152/100 100 % -- None (Room air) -- LD

## 2025-01-14 NOTE — DISCHARGE SUMMARY
[facility] HOSPITALIST  DISCHARGE SUMMARY     Cleopatra Miller Patient Status:  Inpatient    1975 MRN [id number]   Location [facility] 0SW-A Attending Juma Andrews,    Hosp Day # 1 PCP None Pcp     Date of Admission: 2025  Date of Discharge:   2025    Discharge Disposition: Home or Self Care    Discharge Diagnosis:  UTI  Hyponatremia  Alcoholic hepatitis   Cirrhosis     History of Present Illness: Cleopatra Miller is a 49 year old female with history of anxiety depression hypertension migraine headache presents to emergency room to be evaluated for fever abdominal pain not feeling well.  She was initially seen at an urgent care for fever and cough and now she has new symptoms of abdominal pain and fever.  She is complaining of jaundice as well.  Her platelet count is 135 hemoglobin 8.9 .7.  Sodium level 128 potassium 3.2 alk phos 356  bilirubin 6.1.    Brief Synopsis:     #UTI  -Continue rocephin   -Cefadroxil at DC     #Hyponatremia  -Stable   -Likely due to excessive free water intake after discussion with patient  -IV lasix x 1  -Advised to limit free water otherwise add electrolytes     #Alcoholic hepatitis  #Cirrhosis  -LFTs improving  -Stopped drinking 3 months ago - goes to [facility]  -Advised to f/u with GI after DC    #Disposition  -Stable for DC home     Lace+ Score: 50  59-90 High Risk  29-58 Medium Risk  0-28   Low Risk       TCM Follow-Up Recommendation:  LACE 29-58: Moderate Risk of readmission after discharge from the hospital.      Procedures during hospitalization:   None    Incidental or significant findings and recommendations (brief descriptions):  None    Lab/Test results pending at Discharge:   None    Consultants:  None    Discharge Medication List:     Discharge Medications        START taking these medications        Instructions Prescription details   cefadroxil 500 MG Caps  Commonly known as: DURICEF      Take 1 capsule (500 mg total) by mouth 2 (two) times daily  for 5 days.   Stop taking on: 2025  Quantity: 10 capsule  Refills: 0            CONTINUE taking these medications        Instructions Prescription details   ALPRAZolam 0.25 MG Tabs  Commonly known as: Xanax      Take 1 tablet (0.25 mg total) by mouth nightly as needed.   Refills: 0     levocetirizine 5 MG Tabs  Commonly known as: Xyzal      Take 1 tablet (5 mg total) by mouth every morning.   Refills: 0     losartan 100 MG Tabs  Commonly known as: Cozaar      TAKE 1 TABLET BY MOUTH ONCE DAILY   Quantity: 90 tablet  Refills: 0     metoclopramide 10 MG Tabs  Commonly known as: Reglan      Take 1 tablet (10 mg total) by mouth 3 (three) times daily as needed (nausea).   Refills: 0     omeprazole 20 MG Cpdr  Commonly known as: PriLOSEC      TAKE ONE CAPSULE BY MOUTH ONCE DAILY   Quantity: 90 capsule  Refills: 0     ondansetron 4 mg tablet  Commonly known as: Zofran      Take 1 tablet (4 mg total) by mouth every 8 (eight) hours as needed for Nausea.   Refills: 0               Where to Get Your Medications        These medications were sent to [id number] - [place] [id number] [place] [address] [phone], [phone]  [address] [place] [id number]      Hours: 24-hours Phone: [phone]   cefadroxil 500 MG Caps         [facility] reviewed: N/A    Follow-up appointment:   No follow-up provider specified.  Appointments for Next 30 Days 2025 - 2025      None        -----------------------------------------------------------------------------------------------  PATIENT DISCHARGE INSTRUCTIONS: See electronic chart    Juma Andrews DO    Total time spent on discharge plannin minutes     The  [employer] Cures Act makes medical notes like these available to patients in the interest of transparency. Please be advised this is a medical document. Medical documents are intended to carry relevant information, facts as evident, and the clinical opinion of the practitioner. The medical note is  intended as peer to peer communication and may appear blunt or direct. It is written in medical language and may contain abbreviations or verbiage that are unfamiliar.

## 2025-01-14 NOTE — PLAN OF CARE
Rec'd pt from ED at ~1830. Oriented to room, admission navigator completed. A&O x 4. Pt not on telemetry. O2 sats adequate on RA. Pt continent, up ad dora. No C/O pain or SOB. Skin dry and intact. Bed locked and in low position, call light and personal items within reach. Will continue to monitor. POC - IV abx, monitor labs, pain/nausea control.    Problem: Patient/Family Goals  Goal: Patient/Family Long Term Goal  Description: Patient's Long Term Goal: No readmission    Interventions:  - Take medications as prescribed  - See additional Care Plan goals for specific interventions  Outcome: Progressing  Goal: Patient/Family Short Term Goal  Description: Patient's Short Term Goal: Go home    Interventions:   - IV Abx  - See additional Care Plan goals for specific interventions  Outcome: Progressing

## 2025-01-14 NOTE — PROGRESS NOTES
[facility]   part of [facility]     Hospitalist Progress Note     Cleopatra Miller Patient Status:  Inpatient    1975 MRN [id number]   Location [facility] 0SW-A Attending Juma Andrews DO   Hosp Day # 1 PCP None Pcp     Chief Complaint: Cough    Subjective:     Patient seen and examined. Feeling well overall. Wants to go home.     Objective:    Review of Systems:   A comprehensive review of systems was completed; pertinent positive and negatives stated in subjective.    Vital signs:  Temp:  [98.4 °F (36.9 °C)-99.2 °F (37.3 °C)] 99.2 °F (37.3 °C)  Pulse:  [78-99] 86  Resp:  [17-20] 20  BP: (105-142)/(63-94) 118/73  SpO2:  [98 %-99 %] 99 %    Physical Exam:    General: No acute distress  Respiratory: No wheezes, no rhonchi  Cardiovascular: S1, S2, regular rate and rhythm  Abdomen: Soft, Non-tender, non-distended, positive bowel sounds  Neuro: No new focal deficits.   Extremities: No edema    Diagnostic Data:    Labs:  Recent Labs   Lab 25  1158 25  0911   WBC 6.4 5.9   HGB 8.9* 8.8*   .7* 103.3*   .0* 141.0*   INR 1.27*  --        Recent Labs   Lab 25  1158 25  0342 25  0911   *  --  143*   BUN 5*  --  <5*   CREATSERUM 0.90  --  0.81   CA 9.6  --  9.4   ALB 4.3  --  4.1   *  --  127*   K 3.2* 4.5 4.0   CL 90*  --  94*   CO2 26.0  --  24.0   ALKPHO 356*  --  324*   *  --  149*   ALT 26  --  21   BILT 6.1*  --  4.8*   TP 7.4  --  6.9       Estimated Glomerular Filtration Rate: 88.6 mL/min/1.73m2 (by CKD-EPI based on SCr of 0.81 mg/dL).    No results for input(s): \"TROP\", \"TROPHS\", \"CK\" in the last 168 hours.    Recent Labs   Lab 25  1158   PTP 16.0*   INR 1.27*                  Microbiology    Hospital Encounter on 25   1. Blood Culture     Status: None (Preliminary result)    Collection Time: 25 11:59 AM    Specimen: Blood,peripheral   Result Value Ref Range    Blood Culture Result No Growth 1 Day N/A   2. Urine  Culture, Routine     Status: Abnormal (Preliminary result)    Collection Time: 01/13/25 11:58 AM    Specimen: Urine, clean catch   Result Value Ref Range    Urine Culture >100,000 CFU/ML Klebsiella pneumoniae (A) N/A         Imaging: Reviewed in Epic.    Medications:    cetirizine  5 mg Oral Daily    losartan  100 mg Oral Daily    pantoprazole  20 mg Oral QAM AC    cefTRIAXone  1 g Intravenous Q24H    melatonin  3 mg Oral Nightly       Assessment & Plan:      #UTI  -Continue rocephin     #Hyponatremia  -Likely due to excessive free water intake after discussion with patient  -IV lasix x 1  -Advised to limit free water otherwise add electrolytes    #Alcoholic hepatitis  #Cirrhosis  -LFTs improving  -Stopped drinking 3 months ago - goes to AA  -Advised to f/u with GI after DC    #Disposition  -DC planning if sodium improved     Juma Andrews DO    Supplementary Documentation:     Quality:  DVT Mechanical Prophylaxis:   SCDs, Early ambuation  DVT Pharmacologic Prophylaxis   Medication   None                Code Status: Prior  Joy: No urinary catheter in place  Joy Duration (in days):   Central line:    KIESHA: 1/15/2025    Discharge is dependent on: clinical progress  At this point Ms. Miller is expected to be discharge to: home     The 21st Century Cures Act makes medical notes like these available to patients in the interest of transparency. Please be advised this is a medical document. Medical documents are intended to carry relevant information, facts as evident, and the clinical opinion of the practitioner. The medical note is intended as peer to peer communication and may appear blunt or direct. It is written in medical language and may contain abbreviations or verbiage that are unfamiliar.

## 2025-01-15 NOTE — PLAN OF CARE
Pt. A&O x4. VSS. No c/o pain. IV lasix given per MAR. Possible discharge pending evening lab draws. Pt. Able to ambulate independently in the room. Call light within reach.    Problem: GASTROINTESTINAL - ADULT  Goal: Minimal or absence of nausea and vomiting  Description: INTERVENTIONS:  - Maintain adequate hydration with IV or PO as ordered and tolerated  - Nasogastric tube to low intermittent suction as ordered  - Evaluate effectiveness of ordered antiemetic medications  - Provide nonpharmacologic comfort measures as appropriate  - Advance diet as tolerated, if ordered  - Obtain nutritional consult as needed  - Evaluate fluid balance  Outcome: Progressing     Problem: METABOLIC/FLUID AND ELECTROLYTES - ADULT  Goal: Electrolytes maintained within normal limits  Description: INTERVENTIONS:  - Monitor labs and rhythm and assess patient for signs and symptoms of electrolyte imbalances  - Administer electrolyte replacement as ordered  - Monitor response to electrolyte replacements, including rhythm and repeat lab results as appropriate  - Fluid restriction as ordered  - Instruct patient on fluid and nutrition restrictions as appropriate  Outcome: Progressing

## 2025-01-15 NOTE — PAYOR COMM NOTE
--------------  DISCHARGE REVIEW    Payor: [employer] [facility]/HMO/POS/EPO  Subscriber #:  [id number]  Authorization Number: [id number]    Admit date: 1/13/25  Admit time:   6:12 PM  Discharge Date: 1/14/2025  7:09 PM     Admitting Physician: Sarah Kimball MD  Attending Physician:  No att. providers found  Primary Care Physician: Pcp, None

## 2025-01-15 NOTE — PLAN OF CARE
NURSING DISCHARGE NOTE    Discharged Home via Wheelchair.  Accompanied by RN  Belongings Taken by patient/family.      Pt. Cleared for discharge per MD. Discharge instructions given. Pt. Verbalized understanding. IV removed.

## 2025-01-18 LAB
PHOSPHATIDYETHANOL: POSITIVE
PHOSPHATIDYLETHANOL (PETH): 449 NG/ML

## 2025-02-02 ENCOUNTER — APPOINTMENT (OUTPATIENT)
Dept: CT IMAGING | Facility: HOSPITAL | Age: 50
End: 2025-02-02
Attending: EMERGENCY MEDICINE
Payer: COMMERCIAL

## 2025-02-02 ENCOUNTER — APPOINTMENT (OUTPATIENT)
Dept: ULTRASOUND IMAGING | Facility: HOSPITAL | Age: 50
End: 2025-02-02
Attending: EMERGENCY MEDICINE
Payer: COMMERCIAL

## 2025-02-02 ENCOUNTER — HOSPITAL ENCOUNTER (OUTPATIENT)
Facility: HOSPITAL | Age: 50
Setting detail: OBSERVATION
Discharge: HOME | End: 2025-02-04
Attending: EMERGENCY MEDICINE | Admitting: STUDENT IN AN ORGANIZED HEALTH CARE EDUCATION/TRAINING PROGRAM
Payer: COMMERCIAL

## 2025-02-02 DIAGNOSIS — R74.8 ELEVATED LIVER ENZYMES: ICD-10-CM

## 2025-02-02 DIAGNOSIS — R10.9 ABDOMINAL PAIN, ACUTE: Primary | ICD-10-CM

## 2025-02-02 DIAGNOSIS — R19.5 DARK STOOLS: ICD-10-CM

## 2025-02-02 DIAGNOSIS — R93.2 ABNORMAL GALLBLADDER ULTRASOUND: ICD-10-CM

## 2025-02-02 PROBLEM — D64.9 ANEMIA: Status: ACTIVE | Noted: 2025-02-02

## 2025-02-02 PROBLEM — R73.9 HYPERGLYCEMIA: Status: ACTIVE | Noted: 2025-02-02

## 2025-02-02 LAB
ALBUMIN SERPL-MCNC: 4.1 G/DL (ref 3.2–4.8)
ALBUMIN/GLOB SERPL: 1.4 {RATIO} (ref 1–2)
ALP LIVER SERPL-CCNC: 210 U/L
ALT SERPL-CCNC: 27 U/L
ANION GAP SERPL CALC-SCNC: 12 MMOL/L (ref 0–18)
AST SERPL-CCNC: 202 U/L (ref ?–34)
BASOPHILS # BLD AUTO: 0.04 X10(3) UL (ref 0–0.2)
BASOPHILS NFR BLD AUTO: 0.4 %
BILIRUB SERPL-MCNC: 2.6 MG/DL (ref 0.3–1.2)
BILIRUB UR QL STRIP.AUTO: NEGATIVE
BUN BLD-MCNC: 8 MG/DL (ref 9–23)
CALCIUM BLD-MCNC: 9.4 MG/DL (ref 8.7–10.6)
CHLORIDE SERPL-SCNC: 95 MMOL/L (ref 98–112)
CLARITY UR REFRACT.AUTO: CLEAR
CO2 SERPL-SCNC: 23 MMOL/L (ref 21–32)
COLOR UR AUTO: YELLOW
CREAT BLD-MCNC: 0.76 MG/DL
EGFRCR SERPLBLD CKD-EPI 2021: 95 ML/MIN/1.73M2 (ref 60–?)
EOSINOPHIL # BLD AUTO: 0.07 X10(3) UL (ref 0–0.7)
EOSINOPHIL NFR BLD AUTO: 0.7 %
ERYTHROCYTE [DISTWIDTH] IN BLOOD BY AUTOMATED COUNT: 15.8 %
GLOBULIN PLAS-MCNC: 3 G/DL (ref 2–3.5)
GLUCOSE BLD-MCNC: 121 MG/DL (ref 70–99)
GLUCOSE UR STRIP.AUTO-MCNC: NORMAL MG/DL
HCT VFR BLD AUTO: 26.3 %
HGB BLD-MCNC: 9.1 G/DL
IMM GRANULOCYTES # BLD AUTO: 0.05 X10(3) UL (ref 0–1)
IMM GRANULOCYTES NFR BLD: 0.5 %
KETONES UR STRIP.AUTO-MCNC: NEGATIVE MG/DL
LEUKOCYTE ESTERASE UR QL STRIP.AUTO: 25
LIPASE SERPL-CCNC: 64 U/L (ref 12–53)
LYMPHOCYTES # BLD AUTO: 0.99 X10(3) UL (ref 1–4)
LYMPHOCYTES NFR BLD AUTO: 10 %
MCH RBC QN AUTO: 35.5 PG (ref 26–34)
MCHC RBC AUTO-ENTMCNC: 34.6 G/DL (ref 31–37)
MCV RBC AUTO: 102.7 FL
MONOCYTES # BLD AUTO: 0.88 X10(3) UL (ref 0.1–1)
MONOCYTES NFR BLD AUTO: 8.9 %
NEUTROPHILS # BLD AUTO: 7.89 X10 (3) UL (ref 1.5–7.7)
NEUTROPHILS # BLD AUTO: 7.89 X10(3) UL (ref 1.5–7.7)
NEUTROPHILS NFR BLD AUTO: 79.5 %
NITRITE UR QL STRIP.AUTO: NEGATIVE
OSMOLALITY SERPL CALC.SUM OF ELEC: 270 MOSM/KG (ref 275–295)
PH UR STRIP.AUTO: 6.5 [PH] (ref 5–8)
PLATELET # BLD AUTO: 152 10(3)UL (ref 150–450)
POTASSIUM SERPL-SCNC: 3.6 MMOL/L (ref 3.5–5.1)
PROT SERPL-MCNC: 7.1 G/DL (ref 5.7–8.2)
PROT UR STRIP.AUTO-MCNC: NEGATIVE MG/DL
RBC # BLD AUTO: 2.56 X10(6)UL
RBC UR QL AUTO: NEGATIVE
SODIUM SERPL-SCNC: 130 MMOL/L (ref 136–145)
SP GR UR STRIP.AUTO: 1.01 (ref 1–1.03)
UROBILINOGEN UR STRIP.AUTO-MCNC: NORMAL MG/DL
WBC # BLD AUTO: 9.9 X10(3) UL (ref 4–11)

## 2025-02-02 PROCEDURE — 85025 COMPLETE CBC W/AUTO DIFF WBC: CPT

## 2025-02-02 PROCEDURE — 99285 EMERGENCY DEPT VISIT HI MDM: CPT

## 2025-02-02 PROCEDURE — 83690 ASSAY OF LIPASE: CPT | Performed by: EMERGENCY MEDICINE

## 2025-02-02 PROCEDURE — 80053 COMPREHEN METABOLIC PANEL: CPT | Performed by: EMERGENCY MEDICINE

## 2025-02-02 PROCEDURE — 96374 THER/PROPH/DIAG INJ IV PUSH: CPT

## 2025-02-02 PROCEDURE — 83690 ASSAY OF LIPASE: CPT

## 2025-02-02 PROCEDURE — 74177 CT ABD & PELVIS W/CONTRAST: CPT | Performed by: EMERGENCY MEDICINE

## 2025-02-02 PROCEDURE — 76700 US EXAM ABDOM COMPLETE: CPT | Performed by: EMERGENCY MEDICINE

## 2025-02-02 PROCEDURE — 96375 TX/PRO/DX INJ NEW DRUG ADDON: CPT

## 2025-02-02 PROCEDURE — 80053 COMPREHEN METABOLIC PANEL: CPT

## 2025-02-02 PROCEDURE — 81001 URINALYSIS AUTO W/SCOPE: CPT

## 2025-02-02 PROCEDURE — 85025 COMPLETE CBC W/AUTO DIFF WBC: CPT | Performed by: EMERGENCY MEDICINE

## 2025-02-02 PROCEDURE — 81001 URINALYSIS AUTO W/SCOPE: CPT | Performed by: EMERGENCY MEDICINE

## 2025-02-02 RX ORDER — PANTOPRAZOLE SODIUM 40 MG/1
40 TABLET, DELAYED RELEASE ORAL
Status: DISCONTINUED | OUTPATIENT
Start: 2025-02-03 | End: 2025-02-04

## 2025-02-02 RX ORDER — ONDANSETRON 2 MG/ML
4 INJECTION INTRAMUSCULAR; INTRAVENOUS ONCE
Status: COMPLETED | OUTPATIENT
Start: 2025-02-02 | End: 2025-02-02

## 2025-02-02 RX ORDER — SODIUM CHLORIDE 9 MG/ML
1000 INJECTION, SOLUTION INTRAVENOUS ONCE
Status: COMPLETED | OUTPATIENT
Start: 2025-02-02 | End: 2025-02-02

## 2025-02-02 RX ORDER — HYDROMORPHONE HYDROCHLORIDE 1 MG/ML
0.5 INJECTION, SOLUTION INTRAMUSCULAR; INTRAVENOUS; SUBCUTANEOUS EVERY 30 MIN PRN
Status: DISCONTINUED | OUTPATIENT
Start: 2025-02-02 | End: 2025-02-03

## 2025-02-02 RX ORDER — HEPARIN SODIUM 5000 [USP'U]/ML
5000 INJECTION, SOLUTION INTRAVENOUS; SUBCUTANEOUS EVERY 12 HOURS SCHEDULED
Status: DISCONTINUED | OUTPATIENT
Start: 2025-02-02 | End: 2025-02-04

## 2025-02-02 NOTE — ED QUICK NOTES
Airway  Performed by: Jordyn Parsons CRNA  Authorized by: Chris Hernandez Patient stated to this RN that the toilet was full of blood after she urinated, that that has never happened before.

## 2025-02-02 NOTE — ED INITIAL ASSESSMENT (HPI)
Pt arrives ambulatory, was admitted a few weeks ago due to hyponatremia. Hx of cirrhosis, stopped drinking in October. Pt noting increased abdominal pain, swelling and decreasing hgb. +dark stools. A&O x4

## 2025-02-03 ENCOUNTER — APPOINTMENT (OUTPATIENT)
Dept: MRI IMAGING | Facility: HOSPITAL | Age: 50
End: 2025-02-03
Attending: INTERNAL MEDICINE
Payer: COMMERCIAL

## 2025-02-03 PROBLEM — R19.5 DARK STOOLS: Status: ACTIVE | Noted: 2025-02-03

## 2025-02-03 PROBLEM — R93.2 ABNORMAL GALLBLADDER ULTRASOUND: Status: ACTIVE | Noted: 2025-02-03

## 2025-02-03 PROBLEM — R74.8 ELEVATED LIVER ENZYMES: Status: ACTIVE | Noted: 2025-02-03

## 2025-02-03 LAB
ALBUMIN SERPL-MCNC: 3.6 G/DL (ref 3.2–4.8)
ALBUMIN/GLOB SERPL: 1.3 {RATIO} (ref 1–2)
ALP LIVER SERPL-CCNC: 165 U/L
ALT SERPL-CCNC: 22 U/L
ANION GAP SERPL CALC-SCNC: 11 MMOL/L (ref 0–18)
AST SERPL-CCNC: 172 U/L (ref ?–34)
BILIRUB SERPL-MCNC: 2.4 MG/DL (ref 0.3–1.2)
BUN BLD-MCNC: 6 MG/DL (ref 9–23)
CALCIUM BLD-MCNC: 9 MG/DL (ref 8.7–10.6)
CERULOPLASMIN SERPL-MCNC: 32 MG/DL (ref 20–60)
CHLORIDE SERPL-SCNC: 103 MMOL/L (ref 98–112)
CO2 SERPL-SCNC: 22 MMOL/L (ref 21–32)
CREAT BLD-MCNC: 0.63 MG/DL
DEPRECATED HBV CORE AB SER IA-ACNC: 80 NG/ML
EGFRCR SERPLBLD CKD-EPI 2021: 108 ML/MIN/1.73M2 (ref 60–?)
ERYTHROCYTE [DISTWIDTH] IN BLOOD BY AUTOMATED COUNT: 15.8 %
GLOBULIN PLAS-MCNC: 2.7 G/DL (ref 2–3.5)
GLUCOSE BLD-MCNC: 97 MG/DL (ref 70–99)
HCT VFR BLD AUTO: 23.9 %
HGB BLD-MCNC: 8.4 G/DL
IGA SERPL-MCNC: 327.5 MG/DL (ref 40–350)
IGM SERPL-MCNC: 197.7 MG/DL (ref 50–300)
IMMUNOGLOBULIN PNL SER-MCNC: 901 MG/DL (ref 650–1600)
INR BLD: 1.38 (ref 0.8–1.2)
IRON SATN MFR SERPL: 13 %
IRON SERPL-MCNC: 31 UG/DL
MCH RBC QN AUTO: 35.9 PG (ref 26–34)
MCHC RBC AUTO-ENTMCNC: 35.1 G/DL (ref 31–37)
MCV RBC AUTO: 102.1 FL
OSMOLALITY SERPL CALC.SUM OF ELEC: 280 MOSM/KG (ref 275–295)
PLATELET # BLD AUTO: 135 10(3)UL (ref 150–450)
POTASSIUM SERPL-SCNC: 3.6 MMOL/L (ref 3.5–5.1)
PROT SERPL-MCNC: 6.3 G/DL (ref 5.7–8.2)
PROTHROMBIN TIME: 17.1 SECONDS (ref 11.6–14.8)
RBC # BLD AUTO: 2.34 X10(6)UL
SODIUM SERPL-SCNC: 136 MMOL/L (ref 136–145)
TOTAL IRON BINDING CAPACITY: 239 UG/DL (ref 250–425)
TRANSFERRIN SERPL-MCNC: 171 MG/DL (ref 250–380)
WBC # BLD AUTO: 6.9 X10(3) UL (ref 4–11)

## 2025-02-03 PROCEDURE — 80321 ALCOHOLS BIOMARKERS 1OR 2: CPT | Performed by: INTERNAL MEDICINE

## 2025-02-03 PROCEDURE — 86038 ANTINUCLEAR ANTIBODIES: CPT | Performed by: INTERNAL MEDICINE

## 2025-02-03 PROCEDURE — 86665 EPSTEIN-BARR CAPSID VCA: CPT | Performed by: INTERNAL MEDICINE

## 2025-02-03 PROCEDURE — 86747 PARVOVIRUS ANTIBODY: CPT | Performed by: INTERNAL MEDICINE

## 2025-02-03 PROCEDURE — 74181 MRI ABDOMEN W/O CONTRAST: CPT | Performed by: INTERNAL MEDICINE

## 2025-02-03 PROCEDURE — 82728 ASSAY OF FERRITIN: CPT | Performed by: INTERNAL MEDICINE

## 2025-02-03 PROCEDURE — 83550 IRON BINDING TEST: CPT | Performed by: INTERNAL MEDICINE

## 2025-02-03 PROCEDURE — 85027 COMPLETE CBC AUTOMATED: CPT | Performed by: STUDENT IN AN ORGANIZED HEALTH CARE EDUCATION/TRAINING PROGRAM

## 2025-02-03 PROCEDURE — 76376 3D RENDER W/INTRP POSTPROCES: CPT | Performed by: INTERNAL MEDICINE

## 2025-02-03 PROCEDURE — 86644 CMV ANTIBODY: CPT | Performed by: INTERNAL MEDICINE

## 2025-02-03 PROCEDURE — 82104 ALPHA-1-ANTITRYPSIN PHENO: CPT | Performed by: INTERNAL MEDICINE

## 2025-02-03 PROCEDURE — 86664 EPSTEIN-BARR NUCLEAR ANTIGEN: CPT | Performed by: INTERNAL MEDICINE

## 2025-02-03 PROCEDURE — 83540 ASSAY OF IRON: CPT | Performed by: INTERNAL MEDICINE

## 2025-02-03 PROCEDURE — 86376 MICROSOMAL ANTIBODY EACH: CPT | Performed by: INTERNAL MEDICINE

## 2025-02-03 PROCEDURE — 82103 ALPHA-1-ANTITRYPSIN TOTAL: CPT | Performed by: INTERNAL MEDICINE

## 2025-02-03 PROCEDURE — 86645 CMV ANTIBODY IGM: CPT | Performed by: INTERNAL MEDICINE

## 2025-02-03 PROCEDURE — 83516 IMMUNOASSAY NONANTIBODY: CPT | Performed by: INTERNAL MEDICINE

## 2025-02-03 PROCEDURE — 82784 ASSAY IGA/IGD/IGG/IGM EACH: CPT | Performed by: INTERNAL MEDICINE

## 2025-02-03 PROCEDURE — 82390 ASSAY OF CERULOPLASMIN: CPT | Performed by: INTERNAL MEDICINE

## 2025-02-03 PROCEDURE — 85610 PROTHROMBIN TIME: CPT | Performed by: PHYSICIAN ASSISTANT

## 2025-02-03 PROCEDURE — 80053 COMPREHEN METABOLIC PANEL: CPT | Performed by: STUDENT IN AN ORGANIZED HEALTH CARE EDUCATION/TRAINING PROGRAM

## 2025-02-03 RX ORDER — LOSARTAN POTASSIUM 100 MG/1
100 TABLET ORAL DAILY
Status: DISCONTINUED | OUTPATIENT
Start: 2025-02-03 | End: 2025-02-04

## 2025-02-03 RX ORDER — LORAZEPAM 2 MG/ML
0.5 INJECTION INTRAMUSCULAR ONCE
Status: COMPLETED | OUTPATIENT
Start: 2025-02-03 | End: 2025-02-03

## 2025-02-03 RX ORDER — MORPHINE SULFATE 2 MG/ML
0.5 INJECTION, SOLUTION INTRAMUSCULAR; INTRAVENOUS EVERY 2 HOUR PRN
Status: DISCONTINUED | OUTPATIENT
Start: 2025-02-03 | End: 2025-02-03

## 2025-02-03 RX ORDER — SODIUM CHLORIDE 9 MG/ML
INJECTION, SOLUTION INTRAVENOUS CONTINUOUS
Status: DISCONTINUED | OUTPATIENT
Start: 2025-02-03 | End: 2025-02-04

## 2025-02-03 RX ORDER — MORPHINE SULFATE 2 MG/ML
2 INJECTION, SOLUTION INTRAMUSCULAR; INTRAVENOUS EVERY 2 HOUR PRN
Status: DISCONTINUED | OUTPATIENT
Start: 2025-02-03 | End: 2025-02-03

## 2025-02-03 RX ORDER — MORPHINE SULFATE 2 MG/ML
1 INJECTION, SOLUTION INTRAMUSCULAR; INTRAVENOUS EVERY 2 HOUR PRN
Status: DISCONTINUED | OUTPATIENT
Start: 2025-02-03 | End: 2025-02-03

## 2025-02-03 RX ORDER — HYDROMORPHONE HYDROCHLORIDE 1 MG/ML
0.2 INJECTION, SOLUTION INTRAMUSCULAR; INTRAVENOUS; SUBCUTANEOUS EVERY 2 HOUR PRN
Status: DISCONTINUED | OUTPATIENT
Start: 2025-02-03 | End: 2025-02-04

## 2025-02-03 RX ORDER — ALPRAZOLAM 0.25 MG
0.25 TABLET ORAL NIGHTLY PRN
Status: DISCONTINUED | OUTPATIENT
Start: 2025-02-03 | End: 2025-02-04

## 2025-02-03 RX ORDER — CETIRIZINE HYDROCHLORIDE 10 MG/1
10 TABLET ORAL DAILY
Status: DISCONTINUED | OUTPATIENT
Start: 2025-02-03 | End: 2025-02-04

## 2025-02-03 RX ORDER — HYDROMORPHONE HYDROCHLORIDE 1 MG/ML
0.8 INJECTION, SOLUTION INTRAMUSCULAR; INTRAVENOUS; SUBCUTANEOUS EVERY 2 HOUR PRN
Status: DISCONTINUED | OUTPATIENT
Start: 2025-02-03 | End: 2025-02-04

## 2025-02-03 RX ORDER — HYDROMORPHONE HYDROCHLORIDE 1 MG/ML
0.4 INJECTION, SOLUTION INTRAMUSCULAR; INTRAVENOUS; SUBCUTANEOUS EVERY 2 HOUR PRN
Status: DISCONTINUED | OUTPATIENT
Start: 2025-02-03 | End: 2025-02-04

## 2025-02-03 NOTE — DIETARY NOTE
[facility]   part of [facility]   CLINICAL NUTRITION    Cleopatra Miller     Admitting diagnosis:  Elevated liver enzymes [R74.8]  Dark stools [R19.5]  Abdominal pain, acute [R10.9]  Abnormal gallbladder ultrasound [R93.2]    Ht: 165.1 cm (5' 5\")  Wt: 83 kg (183 lb).   Body mass index is 30.45 kg/m².  IBW: 56.8kg    Wt Readings from Last 6 Encounters:   02/03/25 83 kg (183 lb)   01/13/25 83.9 kg (185 lb)   12/13/24 86.2 kg (190 lb)   06/04/24 81.6 kg (180 lb)   05/22/24 81.6 kg (180 lb)   01/27/24 81.6 kg (180 lb)     Labs/Meds reviewed    Diet:       Procedures    NPO     Percent Meals Eaten (last 3 days)       Date/Time Percent Meals Eaten (%)    02/03/25 0038 0 %    02/03/25 0322 0 %    02/03/25 0748 0 %    02/03/25 1155 0 %     Percent Meals Eaten (%): Npo at 02/03/25 1155    02/03/25 1157 0 %     Percent Meals Eaten (%): Npo at 02/03/25 1157            Pt chart reviewed d/t +MST.  49 year old female with abdominal pain and dark stools-possible cholecystitis/cholelithiasis.  GI and surgery both on consult. NPO  Nursing notes reports Percent Meals Eaten (%): 0 % (Npo) intake for last meal. RD to follow for advancement.   No significant weight changes noted.     PMH sig for alcoholic cirrhosis, HTN, anxiety/depression    Patient is at low nutrition risk at this time.    Please consult if patient status changes or nutrition issues arise.    Sue Cardenas RD, LDN  Clinical Nutrition  [id number]

## 2025-02-03 NOTE — H&P
[facility] History and Physical      Chief Complaint   Patient presents with    Abdomen/Flank Pain    Swelling Edema        PCP: Freddy Fuller MD      History of Present Illness: Patient is a 49 year old female with PMH sig for alcoholic cirrhosis, HTN, anxiety/depression p/w abdominal pain and dark stools. Last etoh drink was in October. She's been feeling increased abdominal swelling over the last few weeks. Also has intermittent sharp pain in RUQ that radiates to her back. This causes poor appetite and dimished oral intake. Also reporting some very dark stools recently, more loose than regular. She does take iron supplements for anemia. Also reports that after she urinated she saw the toilet was full of blood.  In the ED VSS. Labs with hyponatremia, T.bili 2.6, elevated LFTs, macrocytic anemia of 9.1. Abd US with gallbladder sludege and mild thickening along with splenomegaly. CT A/P with hepatosplenomegaly and nonspecific GB thickening. GI and surgery consulted and she was admitted for further evaluation and treatment.     Past Medical History:    Alcoholic hepatitis (HCC)    ANXIETY    Anxiety state    CHICKEN POX    MIKE I (cervical intraepithelial neoplasia I)    Cirrhosis of liver (HCC)    DEPRESSION    postpartum on wellbutrin    Depression    Essential hypertension    FHx: migraine headaches    Gestational diabetes (HCC)    insulin controlled    High blood pressure    HPV in female    HYPERTENSION    Hypokalemia    Hyponatremia    IUD (intrauterine device) in place    Mirena Placed for Heavy Menses    Migraines    Mild dysplasia of cervix    on colpo    Visual impairment    readers      Past Surgical History:   Procedure Laterality Date          x1    Colonoscopy  2017    7 mm polyp (heavily cauterized, favoring hyperplastic), int hemorrhoids, repeat     Colonoscopy  2021    Left sided diverticulosis, Normal colon and TI mucosa. small int hemorrhoids    Colposcopy,bx  cervix/endocerv curr  10/29/2010    MIKE 1    Colposcopy,bx cervix/endocerv curr  2012     MIKE 1    Correct bunion,othr methods      with bone rotation    Egd  2021    Mild gastritis, small hiatal hernia, nonobstructive schatzki's ring s/p biopsies, normal duodenum          x 3    Outer ear surgery proc unlisted      Tubal ligation          ALL:  Allergies[1]     No current outpatient medications on file.       Social History     Tobacco Use    Smoking status: Never    Smokeless tobacco: Never   Substance Use Topics    Alcohol use: Yes     Alcohol/week: 1.0 - 4.0 standard drink of alcohol     Types: 1 - 4 Shots of liquor per week        Fam Hx  Family History   Problem Relation Age of Onset    Cancer Father         kidney,prostate,bladder    Diabetes Father     Hypertension Father     Hypertension Paternal Grandmother     Other (Other) Paternal Grandmother     Heart Disorder Maternal Grandmother     Other (Other) Maternal Grandmother     Heart Disorder Maternal Grandfather         MI    Other (Other) Paternal Grandfather         stroke    Breast Cancer Sister 50        Dx Breast CA age 50.    Other (Other) Sister         eczema, crohn's disease    Lipids Neg     Obesity Neg     Psychiatric Neg        Review of Systems  Comprehensive ROS reviewed and negative except for what is stated in HPI.      OBJECTIVE:  /70 (BP Location: Right arm)   Pulse 81   Temp 98.3 °F (36.8 °C) (Oral)   Resp 16   Ht 5' 5\" (1.651 m)   Wt 183 lb (83 kg)   LMP  (LMP Unknown)   SpO2 96%   BMI 30.45 kg/m²   General:  Alert, no distress, appears stated age. Uncomfortable   Head:  Normocephalic, without obvious abnormality, atraumatic.   Eyes:  Sclera anicteric, No conjunctival pallor, EOMs intact.    Nose: Nares normal. Septum midline. Mucosa normal. No drainage.   Throat: Lips, mucosa, and tongue normal. Teeth and gums normal.   Neck: Supple, symmetrical, trachea midline, no cervical or supraclavicular lymph  adenopathy, thyroid: no enlargment/tenderness/nodules appreciated   Lungs:   Clear to auscultation bilaterally. Normal effort   Chest wall:  No tenderness or deformity.   Heart:  Regular rate and rhythm, S1, S2 normal, no murmur, rub or gallop appreciated   Abdomen:   Soft, RUQ ttp. Bowel sounds normal. No masses,  No organomegaly. Non distended   Extremities: Extremities normal, atraumatic, no cyanosis or edema.   Skin: Skin color, texture, turgor normal. No rashes or lesions.    Neurologic: Normal strength, no focal deficit appreciated     Data Review:    LABS:   Lab Results   Component Value Date    WBC 6.9 02/03/2025    HGB 8.4 02/03/2025    HCT 23.9 02/03/2025    .0 02/03/2025    CREATSERUM 0.63 02/03/2025    BUN 6 02/03/2025     02/03/2025    K 3.6 02/03/2025     02/03/2025    CO2 22.0 02/03/2025    GLU 97 02/03/2025    CA 9.0 02/03/2025    ALB 3.6 02/03/2025    ALKPHO 165 02/03/2025    BILT 2.4 02/03/2025    TP 6.3 02/03/2025     02/03/2025    ALT 22 02/03/2025    LIP 64 02/02/2025       CXR: All imaging personally reviewed.      Radiology: CT ABDOMEN+PELVIS(CONTRAST ONLY)(CPT=74177)    Result Date: 2/2/2025  PROCEDURE:  CT ABDOMEN+PELVIS (CONTRAST ONLY) (CPT=74177)  COMPARISON:  [facility], CT, CT ABDOMEN+PELVIS(CONTRAST ONLY)(CPT=74177), 1/13/2025, 1:31 PM.  INDICATIONS:  swelling across abdomen, abnormal labs- hgb 8.6  TECHNIQUE:  CT scanning was performed from the dome of the diaphragm to the pubic symphysis with non-ionic intravenous contrast material. Post contrast coronal MPR imaging was performed.  Dose reduction techniques were used. Dose information is transmitted to the ACR (American College of Radiology) NRDR (National Radiology Data Registry) which includes the Dose Index Registry.  PATIENT STATED HISTORY:(As transcribed by Technologist)    CONTRAST USED:   FINDINGS:  LIVER:  Mild hepatic steatosis is noted.  No focal hepatic lesions are identified.  Liver is enlarged  measuring up to 22.9 cm. BILIARY:  Gallbladder wall thickening is noted. PANCREAS:  No lesion, fluid collection, ductal dilatation, or atrophy.  SPLEEN:  Spleen is enlarged measuring up to 18.8 cm.  KIDNEYS:  No mass, obstruction, or calcification.  ADRENALS:  No mass or enlargement.  AORTA/VASCULAR:  No aneurysm or dissection.  RETROPERITONEUM:  No mass or adenopathy.  BOWEL/MESENTERY:  No visible mass, obstruction, or bowel wall thickening.  Appendix is normal.  Small amount of free fluid is noted in the pelvis. ABDOMINAL WALL:  No mass or hernia.  URINARY BLADDER:  No visible focal wall thickening, lesion, or calculus.  PELVIC NODES:  No adenopathy.  PELVIC ORGANS:  No visible mass.  Pelvic organs appropriate for patient age.  BONES:  No bony lesion or fracture.  LUNG BASES:  No visible pulmonary or pleural disease.  OTHER:  Negative.             CONCLUSION:  1. Hepatosplenomegaly. 2. Heterogeneous enhancement of the liver is nonspecific.  Correlate with LFTs. 3. Gallbladder wall thickening is nonspecific.  No gallstones are identified.  Clinical correlation is advised.   LOCATION:  [facility]   Dictated by (CST): Marc Goodman MD on 2/02/2025 at 11:14 PM     Finalized by (CST): Marc Goodman MD on 2/02/2025 at 11:19 PM       US ABDOMEN COMPLETE (CPT=76700)    Result Date: 2/2/2025  PROCEDURE:  US ABDOMEN COMPLETE (CPT=76700)  COMPARISON:  None.  INDICATIONS:  swelling across abdomen, abnormal labs- hgb 8.6  TECHNIQUE:  Real time gray-scale ultrasound was used to evaluate the abdomen.  The exam includes images of the liver, gallbladder, common bile duct, pancreas, spleen, kidneys, IVC, and aorta.  PATIENT STATED HISTORY: (As transcribed by Technologist)     FINDINGS:  LIVER:  Diffuse hepatic steatosis is noted.  No focal hepatic lesions are noted. BILIARY:  Sludge in the gallbladder is noted.  There is mild gallbladder wall thickening measuring up to 4 millimeters.  No pericholecystic fluid is noted.  Common bile  duct diameter is 5 mm. PANCREAS:  Normal. SPLEEN:  Spleen is enlarged measuring up to 18.3 cm. KIDNEYS:  Normal.  Right kidney measures 11.3 cm.  Left kidney measures 12.5 cm. AORTA/IVC:  Normal.             CONCLUSION:  1. Sludge in the gallbladder with mild gallbladder wall thickening is noted.  There is no pericholecystic fluid.  Negative sonographic Patel sign.  If there is concern for cholecystitis a HIDA may be obtained 2. Diffuse hepatic steatosis. 3. Splenomegaly.   LOCATION:  [facility]    Dictated by (CST): Marc Goodman MD on 2/02/2025 at 9:31 PM     Finalized by (CST): Marc Goodman MD on 2/02/2025 at 9:34 PM       CT ABDOMEN+PELVIS(CONTRAST ONLY)(CPT=74177)    Result Date: 1/13/2025  PROCEDURE:  CT ABDOMEN+PELVIS (CONTRAST ONLY) (CPT=74177)  COMPARISON:  [facility], CT, CT ABDOMEN PELVIS IV CONTRAST, NO ORAL (ER), 12/14/2021, 9:59 PM.  INDICATIONS:  Urinary tract infection, difficulty urinating.  TECHNIQUE:  CT scanning was performed from the dome of the diaphragm to the pubic symphysis with non-ionic intravenous contrast material. Post contrast coronal MPR imaging was performed.  Dose reduction techniques were used. Dose information is transmitted to the ACR (American College of Radiology) NRDR (National Radiology Data Registry) which includes the Dose Index Registry. CONTRAST USED:  100cc of Isovue 370  FINDINGS: LUNG BASE:  Unremarkable. LIVER:  Diffuse fatty infiltration of the liver.  Lobulated contours and heterogeneity of the liver may be related to underlying cirrhosis.  Hepatomegaly measuring up to 23.4 cm craniocaudal dimension. BILIARY:  Unremarkable. SPLEEN:  Splenomegaly measuring up to 16.9 cm in craniocaudal dimension.  Accessory spleen noted. PANCREAS:  Unremarkable. ADRENALS:  Unremarkable. KIDNEYS:  Unremarkable. AORTA/VASCULAR:  Mild mixed plaque in the aorta and iliac arteries. RETROPERITONEUM:  Unremarkable. BOWEL/MESENTERY:  Unremarkable appendix.  Scattered feces in the colon.  No  large or small bowel dilatation.  Minimal free fluid in the pelvis. ABDOMINAL WALL:  Unremarkable. PELVIC ORGANS:  Urinary bladder wall thickening with mild fatty induration is concerning for cystitis.  Clinical correlation recommended.  Unremarkable uterus and ovaries. LYMPH NODES:  No lymphadenopathy in the abdomen or pelvis. BONES:  Degenerative changes in the spine and hips. OTHER:  None.            CONCLUSION:   1. Findings concerning for cystitis.  Clinical correlation recommended.  2. Hepatosplenomegaly with diffuse fatty infiltration of the liver and probable changes of cirrhosis in the liver.  3. Minimal free fluid in the pelvis.   Please see above for further details.  LOCATION:  [facility]   Dictated by (CST): Nj Feldman MD on 1/13/2025 at 1:50 PM     Finalized by (CST): Nj Feldman MD on 1/13/2025 at 1:54 PM          Assessment/Plan:     49 year old female with PMH sig for alcoholic cirrhosis, HTN, anxiety/depression p/w abdominal pain and dark stools.    Abdominal Pain, possible cholecystitis/cholelithiasis  - RUQ ttp  - US/CT reviewed  - PPI  - NPO, IVF  - GI, Surgery consulted - lap andra timing TBD   - supportive cares, prn analgesics/antiemetics     Alcoholic Cirrhosis  - elevated LFTs, bili  - trend  - GI following  - monitor for evidence of portal HTN    Macrocytic anemia  Thrombocytopenia  - check b12/folate    Essential HTN  - hold PTA losartan for soft BP     Anxiety  Depression  - prn ativan PTA    FEN: NPO, PT/OT  Proph: SCDs, heparin  Code status: Full cdoe     Outpatient records or previous hospital records reviewed.   [facility] hospitalist to continue to follow patient while in house      Petey Dodson MD  [facility]  Hospitalist  Message over [email or web address]  Pager: [phone]                 [1]   Allergies  Allergen Reactions    Amlodipine SWELLING    Ace Inhibitors Coughing

## 2025-02-03 NOTE — PLAN OF CARE
Patient resting in bed. VSS. Mild abdominal pain noted. Passing flatus. Denies chest/calf pain. NPO. POC discussed, all questions and concerns addressed. All safety measures in place.

## 2025-02-03 NOTE — ED PROVIDER NOTES
Patient Seen in: [facility] Emergency Department      History     Chief Complaint   Patient presents with    Abdomen/Flank Pain    Swelling Edema     Stated Complaint: swelling across abdomen, abnormal labs- hgb 8.6    Subjective:   HPI      Patient presents with abdominal pain and bloating as well as dark stools.  The patient states that she has a history of liver disease secondary to alcohol and her last drink was in October.  She has been noticing over the past couple of weeks that she is getting more swelling in her abdomen as well as pain.  She states the pain seems to get better and worse and at times is very sharp in the right upper quadrant and radiating to the back.  She states that she is having a difficult time eating and has no appetite.  She has been following with her doctor and her liver enzymes are going up despite the fact that she is not drinking.  She has also had a recent very loose stools that appear very dark in color.  She states she has to have a bowel movement right away after every time she eats.  She denies any fever.  Today she had an episode where after urinating the toilet was full of blood.    Objective:     Past Medical History:    Alcoholic hepatitis (HCC)    ANXIETY    Anxiety state    CHICKEN POX    MIKE I (cervical intraepithelial neoplasia I)    Cirrhosis of liver (HCC)    DEPRESSION    postpartum on wellbutrin    Depression    Essential hypertension    FHx: migraine headaches    Gestational diabetes (HCC)    insulin controlled    High blood pressure    HPV in female    HYPERTENSION    Hypokalemia    Hyponatremia    IUD (intrauterine device) in place    Mirena Placed for Heavy Menses    Migraines    Mild dysplasia of cervix    on colpo    Visual impairment    readers              No pertinent past surgical history.              No pertinent social history.                Physical Exam     ED Triage Vitals [02/02/25 1550]   /84   Pulse 91   Resp 18   Temp 98.6 °F (37  °C)   Temp src Temporal   SpO2 98 %   O2 Device None (Room air)       Current Vitals:   Vital Signs  BP: 104/65  Pulse: 81  Resp: 18  Temp: 98.6 °F (37 °C)  Temp src: Temporal  MAP (mmHg): 77    Oxygen Therapy  SpO2: 97 %  O2 Device: None (Room air)        Physical Exam  General: Alert and oriented x3 in no acute distress.  HEENT: Normocephalic, atraumatic, pupils equal round and reactive to light.  Neck: Supple.  Cardiovascular: Regular rate and rhythm.  Respiratory: Lungs clear to auscultation.  Abdomen: Soft,, mildly distended, diffusely tender but primarily in the right upper quadrant and epigastric region, no rebound or guarding, normal active bowel sounds, no CVA tenderness.  Rectal: External hemorrhoid with area of recent bleeding noted, dark brown stool on FRANKIE.  Extremities: No CCE.  Skin: Warm and dry.    ED Course     Labs Reviewed   COMP METABOLIC PANEL (14) - Abnormal; Notable for the following components:       Result Value    Glucose 121 (*)     Sodium 130 (*)     Chloride 95 (*)     BUN 8 (*)     Calculated Osmolality 270 (*)      (*)     Alkaline Phosphatase 210 (*)     Bilirubin, Total 2.6 (*)     All other components within normal limits   CBC WITH DIFFERENTIAL WITH PLATELET - Abnormal; Notable for the following components:    RBC 2.56 (*)     HGB 9.1 (*)     HCT 26.3 (*)     .7 (*)     MCH 35.5 (*)     Neutrophil Absolute Prelim 7.89 (*)     Neutrophil Absolute 7.89 (*)     Lymphocyte Absolute 0.99 (*)     All other components within normal limits   LIPASE - Abnormal; Notable for the following components:    Lipase 64 (*)     All other components within normal limits   URINALYSIS, ROUTINE - Abnormal; Notable for the following components:    Leukocyte Esterase Urine 25 (*)     WBC Urine 11-20 (*)     Squamous Epi. Cells Few (*)     All other components within normal limits   COMP METABOLIC PANEL (14)   CBC, PLATELET; NO DIFFERENTIAL   RAINBOW DRAW LAVENDER   RAINBOW DRAW LIGHT GREEN    RAINBOW DRAW BLUE     CT ABDOMEN+PELVIS(CONTRAST ONLY)(CPT=74177)    Result Date: 2/2/2025  PROCEDURE:  CT ABDOMEN+PELVIS (CONTRAST ONLY) (CPT=74177)  COMPARISON:  [facility], CT, CT ABDOMEN+PELVIS(CONTRAST ONLY)(CPT=74177), 1/13/2025, 1:31 PM.  INDICATIONS:  swelling across abdomen, abnormal labs- hgb 8.6  TECHNIQUE:  CT scanning was performed from the dome of the diaphragm to the pubic symphysis with non-ionic intravenous contrast material. Post contrast coronal MPR imaging was performed.  Dose reduction techniques were used. Dose information is transmitted to the ACR (American College of Radiology) NRDR (National Radiology Data Registry) which includes the Dose Index Registry.  PATIENT STATED HISTORY:(As transcribed by Technologist)    CONTRAST USED:   FINDINGS:  LIVER:  Mild hepatic steatosis is noted.  No focal hepatic lesions are identified.  Liver is enlarged measuring up to 22.9 cm. BILIARY:  Gallbladder wall thickening is noted. PANCREAS:  No lesion, fluid collection, ductal dilatation, or atrophy.  SPLEEN:  Spleen is enlarged measuring up to 18.8 cm.  KIDNEYS:  No mass, obstruction, or calcification.  ADRENALS:  No mass or enlargement.  AORTA/VASCULAR:  No aneurysm or dissection.  RETROPERITONEUM:  No mass or adenopathy.  BOWEL/MESENTERY:  No visible mass, obstruction, or bowel wall thickening.  Appendix is normal.  Small amount of free fluid is noted in the pelvis. ABDOMINAL WALL:  No mass or hernia.  URINARY BLADDER:  No visible focal wall thickening, lesion, or calculus.  PELVIC NODES:  No adenopathy.  PELVIC ORGANS:  No visible mass.  Pelvic organs appropriate for patient age.  BONES:  No bony lesion or fracture.  LUNG BASES:  No visible pulmonary or pleural disease.  OTHER:  Negative.             CONCLUSION:  1. Hepatosplenomegaly. 2. Heterogeneous enhancement of the liver is nonspecific.  Correlate with LFTs. 3. Gallbladder wall thickening is nonspecific.  No gallstones are identified.  Clinical  correlation is advised.   LOCATION:  [facility]   Dictated by (CST): Marc Goodman MD on 2/02/2025 at 11:14 PM     Finalized by (CST): Marc Goodman MD on 2/02/2025 at 11:19 PM       US ABDOMEN COMPLETE (CPT=76700)    Result Date: 2/2/2025  PROCEDURE:  US ABDOMEN COMPLETE (CPT=76700)  COMPARISON:  None.  INDICATIONS:  swelling across abdomen, abnormal labs- hgb 8.6  TECHNIQUE:  Real time gray-scale ultrasound was used to evaluate the abdomen.  The exam includes images of the liver, gallbladder, common bile duct, pancreas, spleen, kidneys, IVC, and aorta.  PATIENT STATED HISTORY: (As transcribed by Technologist)     FINDINGS:  LIVER:  Diffuse hepatic steatosis is noted.  No focal hepatic lesions are noted. BILIARY:  Sludge in the gallbladder is noted.  There is mild gallbladder wall thickening measuring up to 4 millimeters.  No pericholecystic fluid is noted.  Common bile duct diameter is 5 mm. PANCREAS:  Normal. SPLEEN:  Spleen is enlarged measuring up to 18.3 cm. KIDNEYS:  Normal.  Right kidney measures 11.3 cm.  Left kidney measures 12.5 cm. AORTA/IVC:  Normal.             CONCLUSION:  1. Sludge in the gallbladder with mild gallbladder wall thickening is noted.  There is no pericholecystic fluid.  Negative sonographic Patel sign.  If there is concern for cholecystitis a HIDA may be obtained 2. Diffuse hepatic steatosis. 3. Splenomegaly.   LOCATION:  [facility]    Dictated by (CST): Marc Goodman MD on 2/02/2025 at 9:31 PM     Finalized by (CST): Marc Goodman MD on 2/02/2025 at 9:34 PM          Medications   HYDROmorphone (Dilaudid) 1 MG/ML injection 0.5 mg (0.5 mg Intravenous Given 2/2/25 2514)   heparin (Porcine) 5000 UNIT/ML injection 5,000 Units (has no administration in time range)   pantoprazole (Protonix) DR tab 40 mg (has no administration in time range)   pantoprazole (Protonix) 40 mg in sodium chloride 0.9% PF 10 mL IV push (40 mg Intravenous Given 2/2/25 1943)   ondansetron (Zofran) 4 MG/2ML injection 4 mg (4  mg Intravenous Given 2/2/25 7987)   sodium chloride 0.9% infusion 1,000 mL (1,000 mL Intravenous Handoff 2/2/25 1770)   iopamidol 76% (ISOVUE-370) injection for power injector (100 mL Intravenous Given 2/2/25 8052)     Patient was given IV Protonix and IV fluid initially.  She complained more of abdominal pain and was given Dilaudid with Zofran.       [facility]      Patient presents with upper abdominal pain, distention and dark stools.  Differential diagnosis includes but is not limited to cirrhosis with ascites, acute cholecystitis, acute pancreatitis and GI bleeding.  The patient has questionable cholecystitis on imaging.  Her liver enzymes are elevated but overall improved from last month.  She does have a mildly elevated lipase but no evidence of pancreatitis on imaging.  She does not have evidence of ascites on imaging.  It is difficult to evaluate the patient's dark stools because she does have blood on exam from an external hemorrhoid.  Her hemoglobin is relatively stable from her most recent labs in the computer as well.  Nonetheless the patient is still having abdominal pain requiring pain medication.  She will be admitted to Dr. Magana from the hospitalist service for further evaluation and we have discussed the case.  I think she would benefit from a surgery consult for possible cholecystitis.  She is also following closely with GI for her underlying condition.  She also would benefit from further evaluation for possible GI bleeding.  The patient feels comfortable with the plan to stay in the hospital.    Admission disposition: 2/2/2025 11:23 PM           [facility]    Disposition and Plan     Clinical Impression:  1. Abdominal pain, acute    2. Dark stools    3. Abnormal gallbladder ultrasound    4. Elevated liver enzymes         Disposition:  Admit  2/2/2025 11:23 pm    Follow-up:  No follow-up provider specified.        Medications Prescribed:  Current Discharge Medication List              Supplementary  Documentation:         Hospital Problems       Present on Admission  Date Reviewed: 6/1/2024            ICD-10-CM Noted POA    * (Principal) Abdominal pain, acute R10.9 2/2/2025 Unknown    Anemia D64.9 2/2/2025 Yes    Hyperglycemia R73.9 2/2/2025 Yes

## 2025-02-03 NOTE — PLAN OF CARE
Problem: PAIN - ADULT  Goal: Verbalizes/displays adequate comfort level or patient's stated pain goal  Description: INTERVENTIONS:  - Encourage pt to monitor pain and request assistance  - Assess pain using appropriate pain scale  - Administer analgesics based on type and severity of pain and evaluate response  - Implement non-pharmacological measures as appropriate and evaluate response  - Consider cultural and social influences on pain and pain management  - Manage/alleviate anxiety  - Utilize distraction and/or relaxation techniques  - Monitor for opioid side effects  - Notify MD/LIP if interventions unsuccessful or patient reports new pain  - Anticipate increased pain with activity and pre-medicate as appropriate  Outcome: Progressing     Problem: SAFETY ADULT - FALL  Goal: Free from fall injury  Description: INTERVENTIONS:  - Assess pt frequently for physical needs  - Identify cognitive and physical deficits and behaviors that affect risk of falls.  - [place] fall precautions as indicated by assessment.  - Educate pt/family on patient safety including physical limitations  - Instruct pt to call for assistance with activity based on assessment  - Modify environment to reduce risk of injury  - Provide assistive devices as appropriate  - Consider OT/PT consult to assist with strengthening/mobility  - Encourage toileting schedule  Outcome: Progressing     Problem: GASTROINTESTINAL - ADULT  Goal: Minimal or absence of nausea and vomiting  Description: INTERVENTIONS:  - Maintain adequate hydration with IV or PO as ordered and tolerated  - Nasogastric tube to low intermittent suction as ordered  - Evaluate effectiveness of ordered antiemetic medications  - Provide nonpharmacologic comfort measures as appropriate  - Advance diet as tolerated, if ordered  - Obtain nutritional consult as needed  - Evaluate fluid balance  Outcome: Progressing  Goal: Maintains or returns to baseline bowel function  Description:  INTERVENTIONS:  - Assess bowel function  - Maintain adequate hydration with IV or PO as ordered and tolerated  - Evaluate effectiveness of GI medications  - Encourage mobilization and activity  - Obtain nutritional consult as needed  - Establish a toileting routine/schedule  - Consider collaborating with pharmacy to review patient's medication profile  Outcome: Progressing     Problem: Patient/Family Goals  Goal: Patient/Family Long Term Goal  Description: Patient's Long Term Goal: Treat  and discharge    Interventions:  - NPO  -IVF  -Manage pain  -Safety preacutions  - See additional Care Plan goals for specific interventions  Outcome: Progressing  Goal: Patient/Family Short Term Goal  Description: Patient's Short Term Goal: comfort measures    Interventions:   - Assess needs  -Prn pain meds  -Call light in reach  - See additional Care Plan goals for specific interventions  Outcome: Progressing

## 2025-02-03 NOTE — PAYOR COMM NOTE
--------------  ADMISSION REVIEW     Payor: [employer] CHOICE/HMO/POS/EPO  Subscriber #:  [id number]  Authorization Number: [id number]    Admit date: 2/3/25  Admit time: 12:27 AM       REVIEW DOCUMENTATION:     ED Provider Notes        ED Provider Notes signed by Karol Caraballo MD at 2/3/2025 12:05 AM            Patient Seen in: [facility] Emergency Department      History     Chief Complaint   Patient presents with    Abdomen/Flank Pain    Swelling Edema     Stated Complaint: swelling across abdomen, abnormal labs- hgb 8.6    Subjective:   HPI      Patient presents with abdominal pain and bloating as well as dark stools.  The patient states that she has a history of liver disease secondary to alcohol and her last drink was in October.  She has been noticing over the past couple of weeks that she is getting more swelling in her abdomen as well as pain.  She states the pain seems to get better and worse and at times is very sharp in the right upper quadrant and radiating to the back.  She states that she is having a difficult time eating and has no appetite.  She has been following with her doctor and her liver enzymes are going up despite the fact that she is not drinking.  She has also had a recent very loose stools that appear very dark in color.  She states she has to have a bowel movement right away after every time she eats.  She denies any fever.  Today she had an episode where after urinating the toilet was full of blood.    Objective:     Past Medical History:    Alcoholic hepatitis (HCC)    ANXIETY    Anxiety state    CHICKEN POX    MIKE I (cervical intraepithelial neoplasia I)    Cirrhosis of liver (HCC)    DEPRESSION    postpartum on wellbutrin    Depression    Essential hypertension    FHx: migraine headaches    Gestational diabetes (HCC)    insulin controlled    High blood pressure    HPV in female    HYPERTENSION    Hypokalemia    Hyponatremia    IUD (intrauterine device) in place     Mirena Placed for Heavy Menses    Migraines    Mild dysplasia of cervix    on colpo    Visual impairment    readers     Physical Exam     ED Triage Vitals [02/02/25 1550]   /84   Pulse 91   Resp 18   Temp 98.6 °F (37 °C)   Temp src Temporal   SpO2 98 %   O2 Device None (Room air)       Current Vitals:   Vital Signs  BP: 104/65  Pulse: 81  Resp: 18  Temp: 98.6 °F (37 °C)  Temp src: Temporal  MAP (mmHg): 77    Oxygen Therapy  SpO2: 97 %  O2 Device: None (Room air)        Physical Exam  General: Alert and oriented x3 in no acute distress.  HEENT: Normocephalic, atraumatic, pupils equal round and reactive to light.  Neck: Supple.  Cardiovascular: Regular rate and rhythm.  Respiratory: Lungs clear to auscultation.  Abdomen: Soft,, mildly distended, diffusely tender but primarily in the right upper quadrant and epigastric region, no rebound or guarding, normal active bowel sounds, no CVA tenderness.  Rectal: External hemorrhoid with area of recent bleeding noted, dark brown stool on FRANKIE.  Extremities: No CCE.  Skin: Warm and dry.    ED Course     Labs Reviewed   COMP METABOLIC PANEL (14) - Abnormal; Notable for the following components:       Result Value    Glucose 121 (*)     Sodium 130 (*)     Chloride 95 (*)     BUN 8 (*)     Calculated Osmolality 270 (*)      (*)     Alkaline Phosphatase 210 (*)     Bilirubin, Total 2.6 (*)     All other components within normal limits   CBC WITH DIFFERENTIAL WITH PLATELET - Abnormal; Notable for the following components:    RBC 2.56 (*)     HGB 9.1 (*)     HCT 26.3 (*)     .7 (*)     MCH 35.5 (*)     Neutrophil Absolute Prelim 7.89 (*)     Neutrophil Absolute 7.89 (*)     Lymphocyte Absolute 0.99 (*)     All other components within normal limits   LIPASE - Abnormal; Notable for the following components:    Lipase 64 (*)     All other components within normal limits   URINALYSIS, ROUTINE - Abnormal; Notable for the following components:    Leukocyte Esterase Urine  25 (*)     WBC Urine 11-20 (*)     Squamous Epi. Cells Few (*)     All other components within normal limits   COMP METABOLIC PANEL (14)   CBC, PLATELET; NO DIFFERENTIAL   RAINBOW DRAW LAVENDER   RAINBOW DRAW LIGHT GREEN   RAINBOW DRAW BLUE     CT ABDOMEN+PELVIS(CONTRAST ONLY)(CPT=74177)    Result Date: 2/2/2025  PROCEDURE:  CT ABDOMEN+PELVIS (CONTRAST ONLY) (CPT=74177)  COMPARISON:  [facility], CT, CT ABDOMEN+PELVIS(CONTRAST ONLY)(CPT=74177), 1/13/2025, 1:31 PM.  INDICATIONS:  swelling across abdomen, abnormal labs- hgb 8.6  TECHNIQUE:  CT scanning was performed from the dome of the diaphragm to the pubic symphysis with non-ionic intravenous contrast material. Post contrast coronal MPR imaging was performed.  Dose reduction techniques were used. Dose information is transmitted to the ACR (American College of Radiology) NRDR (National Radiology Data Registry) which includes the Dose Index Registry.  PATIENT STATED HISTORY:(As transcribed by Technologist)    CONTRAST USED:   FINDINGS:  LIVER:  Mild hepatic steatosis is noted.  No focal hepatic lesions are identified.  Liver is enlarged measuring up to 22.9 cm. BILIARY:  Gallbladder wall thickening is noted. PANCREAS:  No lesion, fluid collection, ductal dilatation, or atrophy.  SPLEEN:  Spleen is enlarged measuring up to 18.8 cm.  KIDNEYS:  No mass, obstruction, or calcification.  ADRENALS:  No mass or enlargement.  AORTA/VASCULAR:  No aneurysm or dissection.  RETROPERITONEUM:  No mass or adenopathy.  BOWEL/MESENTERY:  No visible mass, obstruction, or bowel wall thickening.  Appendix is normal.  Small amount of free fluid is noted in the pelvis. ABDOMINAL WALL:  No mass or hernia.  URINARY BLADDER:  No visible focal wall thickening, lesion, or calculus.  PELVIC NODES:  No adenopathy.  PELVIC ORGANS:  No visible mass.  Pelvic organs appropriate for patient age.  BONES:  No bony lesion or fracture.  LUNG BASES:  No visible pulmonary or pleural disease.  OTHER:  Negative.              CONCLUSION:  1. Hepatosplenomegaly. 2. Heterogeneous enhancement of the liver is nonspecific.  Correlate with LFTs. 3. Gallbladder wall thickening is nonspecific.  No gallstones are identified.  Clinical correlation is advised.   LOCATION:  [facility]   Dictated by (CST): Marc Goodman MD on 2/02/2025 at 11:14 PM     Finalized by (CST): Marc Goodman MD on 2/02/2025 at 11:19 PM       US ABDOMEN COMPLETE (CPT=76700)    Result Date: 2/2/2025  PROCEDURE:  US ABDOMEN COMPLETE (CPT=76700)  COMPARISON:  None.  INDICATIONS:  swelling across abdomen, abnormal labs- hgb 8.6  TECHNIQUE:  Real time gray-scale ultrasound was used to evaluate the abdomen.  The exam includes images of the liver, gallbladder, common bile duct, pancreas, spleen, kidneys, IVC, and aorta.  PATIENT STATED HISTORY: (As transcribed by Technologist)     FINDINGS:  LIVER:  Diffuse hepatic steatosis is noted.  No focal hepatic lesions are noted. BILIARY:  Sludge in the gallbladder is noted.  There is mild gallbladder wall thickening measuring up to 4 millimeters.  No pericholecystic fluid is noted.  Common bile duct diameter is 5 mm. PANCREAS:  Normal. SPLEEN:  Spleen is enlarged measuring up to 18.3 cm. KIDNEYS:  Normal.  Right kidney measures 11.3 cm.  Left kidney measures 12.5 cm. AORTA/IVC:  Normal.             CONCLUSION:  1. Sludge in the gallbladder with mild gallbladder wall thickening is noted.  There is no pericholecystic fluid.  Negative sonographic Patel sign.  If there is concern for cholecystitis a HIDA may be obtained 2. Diffuse hepatic steatosis. 3. Splenomegaly.   LOCATION:  [facility]    Dictated by (CST): Marc Goodman MD on 2/02/2025 at 9:31 PM     Finalized by (CST): Marc Goodman MD on 2/02/2025 at 9:34 PM          Medications   HYDROmorphone (Dilaudid) 1 MG/ML injection 0.5 mg (0.5 mg Intravenous Given 2/2/25 9464)   heparin (Porcine) 5000 UNIT/ML injection 5,000 Units (has no administration in time range)   pantoprazole  (Protonix) DR tab 40 mg (has no administration in time range)   pantoprazole (Protonix) 40 mg in sodium chloride 0.9% PF 10 mL IV push (40 mg Intravenous Given 2/2/25 1943)   ondansetron (Zofran) 4 MG/2ML injection 4 mg (4 mg Intravenous Given 2/2/25 2322)   sodium chloride 0.9% infusion 1,000 mL (1,000 mL Intravenous Handoff 2/2/25 3168)   iopamidol 76% (ISOVUE-370) injection for power injector (100 mL Intravenous Given 2/2/25 2251)     Patient was given IV Protonix and IV fluid initially.  She complained more of abdominal pain and was given Dilaudid with Zofran.      MDM      Patient presents with upper abdominal pain, distention and dark stools.  Differential diagnosis includes but is not limited to cirrhosis with ascites, acute cholecystitis, acute pancreatitis and GI bleeding.  The patient has questionable cholecystitis on imaging.  Her liver enzymes are elevated but overall improved from last month.  She does have a mildly elevated lipase but no evidence of pancreatitis on imaging.  She does not have evidence of ascites on imaging.  It is difficult to evaluate the patient's dark stools because she does have blood on exam from an external hemorrhoid.  Her hemoglobin is relatively stable from her most recent labs in the computer as well.  Nonetheless the patient is still having abdominal pain requiring pain medication.  She will be admitted to Dr. Magana from the hospitalist service for further evaluation and we have discussed the case.  I think she would benefit from a surgery consult for possible cholecystitis.  She is also following closely with GI for her underlying condition.  She also would benefit from further evaluation for possible GI bleeding.  The patient feels comfortable with the plan to stay in the hospital.    Admission disposition: 2/2/2025 11:23 PM           [facility]    Disposition and Plan     Clinical Impression:  1. Abdominal pain, acute    2. Dark stools    3. Abnormal gallbladder ultrasound    4.  Elevated liver enzymes         Disposition:  Admit  2/2/2025 11:23 pm          Hospital Problems       Present on Admission  Date Reviewed: 6/1/2024            ICD-10-CM Noted POA    * (Principal) Abdominal pain, acute R10.9 2/2/2025 Unknown    Anemia D64.9 2/2/2025 Yes    Hyperglycemia R73.9 2/2/2025 Yes            Signed by Karol Caraballo MD on 2/3/2025 12:05 AM             History and Physical    H&P signed by Tin Dodson MD at 2/3/2025 11:52 AM    [facility] History and Physical           Chief Complaint   Patient presents with    Abdomen/Flank Pain    Swelling Edema         PCP: Freddy Fuller MD        History of Present Illness: Patient is a 49 year old female with PMH sig for alcoholic cirrhosis, HTN, anxiety/depression p/w abdominal pain and dark stools. Last etoh drink was in October. She's been feeling increased abdominal swelling over the last few weeks. Also has intermittent sharp pain in RUQ that radiates to her back. This causes poor appetite and dimished oral intake. Also reporting some very dark stools recently, more loose than regular. She does take iron supplements for anemia. Also reports that after she urinated she saw the toilet was full of blood.  In the ED VSS. Labs with hyponatremia, T.bili 2.6, elevated LFTs, macrocytic anemia of 9.1. Abd US with gallbladder sludege and mild thickening along with splenomegaly. CT A/P with hepatosplenomegaly and nonspecific GB thickening. GI and surgery consulted and she was admitted for further evaluation and treatment.        Assessment/Plan:      49 year old female with PMH sig for alcoholic cirrhosis, HTN, anxiety/depression p/w abdominal pain and dark stools.     Abdominal Pain, possible cholecystitis/cholelithiasis  - RUQ ttp  - US/CT reviewed  - PPI  - NPO, IVF  - GI, Surgery consulted - lap andra timing TBD   - supportive cares, prn analgesics/antiemetics      Alcoholic Cirrhosis  - elevated LFTs, bili  - trend  - GI  following  - monitor for evidence of portal HTN     Macrocytic anemia  Thrombocytopenia  - check b12/folate     Essential HTN  - hold PTA losartan for soft BP      Anxiety  Depression  - prn ativan PTA     FEN: NPO, PT/OT  Proph: SCDs, heparin  Code status: Full cdoe      Outpatient records or previous hospital records reviewed.   [facility] hospitalist to continue to follow patient while in house        Petey Dodson MD  [facility]          CONSULT  Impression:     48 y/o with abdominal pain  No ETOH since Oct 2024  No leukocytosis  Bili 2.4     Plan:     Will await for MRCP   NPO ice chips  IV fluids  Reviewed with patient possibly order HIDA scan pending imaging   Reviewed should she need surgical management timing to be determined by Dr Gómez  All questions answered  Patient seen and examined with EDGARDO Mantilla  [facility]  General Surgery  2/3/2025         MEDICATIONS ADMINISTERED IN LAST 1 DAY:  HYDROmorphone (Dilaudid) 1 MG/ML injection 0.5 mg       Date Action Dose Route User    2/2/2025 2324 Given 0.5 mg Intravenous Radha Cantrell RN          HYDROmorphone (Dilaudid) 1 MG/ML injection 0.4 mg       Date Action Dose Route User    2/3/2025 1259 Given 0.4 mg Intravenous Esther Man RN          iopamidol 76% (ISOVUE-370) injection for power injector       Date Action Dose Route User    2/2/2025 2251 Given 100 mL Intravenous Magolan, Rody A          morphINE PF 2 MG/ML injection 2 mg       Date Action Dose Route User    2/3/2025 1127 Given 2 mg Intravenous Lashae Fritz RN    2/3/2025 0612 Given 2 mg Intravenous Tej Carranza RN          ondansetron (Zofran) 4 MG/2ML injection 4 mg       Date Action Dose Route User    2/2/2025 2322 Given 4 mg Intravenous Radha Cantrell RN          pantoprazole (Protonix) 40 mg in sodium chloride 0.9% PF 10 mL IV push       Date Action Dose Route User    2/2/2025 1943 Given 40 mg Intravenous Jen Kaplan RN          sodium  chloride 0.9% infusion 1,000 mL       Date Action Dose Route User    2/2/2025 2311 New Bag 1,000 mL Intravenous Radha Cantrell, RN          sodium chloride 0.9% infusion       Date Action Dose Route User    2/3/2025 1509 New Bag (none) Intravenous Kyra Brown, REY    2/3/2025 0145 New Bag (none) Intravenous Tej Carranza, REY            Vitals (last day)       Date/Time Temp Pulse Resp BP SpO2 Weight O2 Device O2 Flow Rate (L/min) [facility]    02/03/25 1157 98 °F (36.7 °C) 77 18 116/77 96 % -- None (Room air) 0 L/min     02/03/25 0748 98.3 °F (36.8 °C) 81 16 116/70 96 % -- None (Room air) 0 L/min     02/03/25 0322 98.5 °F (36.9 °C) 75 16 93/55 98 % -- None (Room air) --     02/03/25 0038 97.7 °F (36.5 °C) 91 18 109/73 98 % 183 lb (83 kg) None (Room air) --     02/02/25 2330 -- 81 -- 104/65 97 % -- -- -- DV    02/02/25 2300 -- 79 18 131/83 100 % -- None (Room air) -- DV    02/02/25 2230 -- 80 -- 127/81 98 % -- None (Room air) -- ET    02/02/25 2200 -- 84 -- 130/84 100 % -- None (Room air) -- ET    02/02/25 2145 -- 85 -- -- 100 % -- -- -- ET    02/02/25 2130 -- 83 -- 110/70 100 % -- -- -- ET    02/02/25 2115 -- 82 -- 113/74 100 % -- -- -- ET    02/02/25 1900 -- 87 18 -- 100 % -- -- -- ET    02/02/25 1833 -- -- -- -- -- -- None (Room air) -- AT    02/02/25 1830 -- 80 22 128/83 100 % -- None (Room air) -- AT    02/02/25 1800 -- 81 24 136/87 100 % -- None (Room air) -- AT    02/02/25 1730 -- 81 22 135/78 100 % -- None (Room air) -- AT    02/02/25 1550 98.6 °F (37 °C) 91 18 132/84 98 % 180 lb (81.6 kg) None (Room air) -- MC

## 2025-02-03 NOTE — PROGRESS NOTES
NURSING ADMISSION NOTE      Patient admitted via Cart  Oriented to room.  Safety precautions initiated.  Bed in low position.  Call light in reach.    Pt is alert and oriented, vss on room air. Tele-nsr  Pt reports mild abdominal pain, declines pain med  Denies nausea  2 persons skin check with JAY Glaser, no skin breakdown noted. Pt skin is jaundice.   NPO, IVF infusing, updated on the plan of care, call light in reach.

## 2025-02-03 NOTE — ED QUICK NOTES
Patient updated on room number. Patient unable to rate pain at this time as meds were recently given. Awaiting transport at this time.

## 2025-02-03 NOTE — ED QUICK NOTES
Orders for admission, patient is aware of plan and ready to go upstairs. Any questions, please call [facility] RE[facility] Li at extension [phone].     Patient Covid vaccination status: Unvaccinated     COVID Test Ordered in ED: None    COVID Suspicion at Admission: N/A    Running Infusions:  None    Mental Status/LOC at time of transport: x4    Other pertinent information: Ambulatory  CIWA score: N/A   NIH score:  N/A

## 2025-02-03 NOTE — CONSULTS
[facility]  Report of Consultation    Cleopatra Miller Patient Status:  Inpatient    1975 MRN [id number]   Location [facility] 3NW-A Attending Iv[place], Tin Angelo*   Hosp Day # 0 PCP Freddy Fuller MD     2/3/25  Reason for Consultation:    Surgical Consultation     CC:   Chief Complaint   Patient presents with    Abdomen/Flank Pain    Swelling Edema        History of Present Illness:    Cleopatra Miller is a a(n) 49 year old female. Patient complains of abdominal pain, reports in upper abdomen typically to the right though on occasion feels pain to the left upper abdomen. She does report after eating she has dark stool/diarrhea.   She feels bloated, denies any fevers, no emesis.   US obtained revealing hepatic steatosis, sludge in gb, mild thickening of gb wall, cbd 5mm  CT scan enlarged liver, gb wall thickening noted  Lfts with elevation, bili 2.4  She currently reports No ETOH since Oct 2024  Evaluated by GI, MRCP has been ordered, pending     Past Medical History:    Past Medical History:    Alcoholic hepatitis (HCC)    ANXIETY    Anxiety state    CHICKEN POX    MIKE I (cervical intraepithelial neoplasia I)    Cirrhosis of liver (HCC)    DEPRESSION    postpartum on wellbutrin    Depression    Essential hypertension    FHx: migraine headaches    Gestational diabetes (HCC)    insulin controlled    High blood pressure    HPV in female    HYPERTENSION    Hypokalemia    Hyponatremia    IUD (intrauterine device) in place    Mirena Placed for Heavy Menses    Migraines    Mild dysplasia of cervix    on colpo    Visual impairment    readers       Past Surgical History:    Past Surgical History:   Procedure Laterality Date          x1    Colonoscopy  2017    7 mm polyp (heavily cauterized, favoring hyperplastic), int hemorrhoids, repeat     Colonoscopy  2021    Left sided diverticulosis, Normal colon and TI mucosa. small int hemorrhoids    Colposcopy,bx cervix/endocerv curr   10/29/2010    MIKE 1    Colposcopy,bx cervix/endocerv curr  2012     MIKE 1    Correct bunion,othr methods      with bone rotation    Egd  2021    Mild gastritis, small hiatal hernia, nonobstructive schatzki's ring s/p biopsies, normal duodenum          x 3    Outer ear surgery proc unlisted      Tubal ligation         Family History:    Family History   Problem Relation Age of Onset    Cancer Father         kidney,prostate,bladder    Diabetes Father     Hypertension Father     Hypertension Paternal Grandmother     Other (Other) Paternal Grandmother     Heart Disorder Maternal Grandmother     Other (Other) Maternal Grandmother     Heart Disorder Maternal Grandfather         MI    Other (Other) Paternal Grandfather         stroke    Breast Cancer Sister 50        Dx Breast CA age 50.    Other (Other) Sister         eczema, crohn's disease    Lipids Neg     Obesity Neg     Psychiatric Neg        Social History:     reports that she has never smoked. She has never used smokeless tobacco. She reports current alcohol use of about 1.0 - 4.0 standard drink of alcohol per week. She reports that she does not use drugs.    Allergies:    Allergies[1]    Current Medications:      Current Facility-Administered Medications:     influenza virus trivalent PF (Fluzone Trivalent) 0.5 mL IM injection (ages 6 months to 64 years) 0.5 mL, 0.5 mL, Intramuscular, Prior to discharge    sodium chloride 0.9% infusion, , Intravenous, Continuous    ALPRAZolam (Xanax) tab 0.25 mg, 0.25 mg, Oral, Nightly PRN    [Held by provider] losartan (Cozaar) tab 100 mg, 100 mg, Oral, Daily    HYDROmorphone (Dilaudid) 1 MG/ML injection 0.2 mg, 0.2 mg, Intravenous, Q2H PRN **OR** HYDROmorphone (Dilaudid) 1 MG/ML injection 0.4 mg, 0.4 mg, Intravenous, Q2H PRN **OR** HYDROmorphone (Dilaudid) 1 MG/ML injection 0.8 mg, 0.8 mg, Intravenous, Q2H PRN    heparin (Porcine) 5000 UNIT/ML injection 5,000 Units, 5,000 Units, Subcutaneous, 2 times per  day    pantoprazole (Protonix) DR tab 40 mg, 40 mg, Oral, QAM AC    Home Medications:    Medications Ordered Prior to Encounter[2]    Review of Systems:    10 point review performed pertinent positives and negatives per history of present illness.    Physical Exam:    Blood pressure 116/77, pulse 77, temperature 98 °F (36.7 °C), temperature source Oral, resp. rate 18, height 5' 5\" (1.651 m), weight 183 lb (83 kg), SpO2 96%, not currently breastfeeding.    GENERAL: Alert and oriented x 3, well developed, well nourished female, in no apparent distress    SKIN: anicteric    RESPIRATORY: non labored breathing    CARDIOVASCULAR: RRR    ABDOMEN: liver edge palpable, soft, mild tenderness in RUQ no peritonitis     LYMPHATIC: no lymphadenopathy    EXTREMITIES: no cyanosis, clubbing or edema    .    Laboratory Data:  Recent Labs   Lab 02/02/25  1556 02/03/25  0602 02/03/25  1313   WBC 9.9 6.9  --    HGB 9.1* 8.4*  --    .7* 102.1*  --    .0 135.0*  --    INR  --   --  1.38*       Recent Labs   Lab 02/02/25  1556 02/03/25  0603   * 136   K 3.6 3.6   CL 95* 103   CO2 23.0 22.0   BUN 8* 6*   CREATSERUM 0.76 0.63   * 97   CA 9.4 9.0       Recent Labs   Lab 02/02/25  1556 02/03/25  0603   ALT 27 22   * 172*   ALB 4.1 3.6       No results for input(s): \"TROP\" in the last 168 hours.          Radiology:    CT ABDOMEN+PELVIS(CONTRAST ONLY)(CPT=74177)    Result Date: 2/2/2025  PROCEDURE:  CT ABDOMEN+PELVIS (CONTRAST ONLY) (CPT=74177)  COMPARISON:  [facility], CT, CT ABDOMEN+PELVIS(CONTRAST ONLY)(CPT=74177), 1/13/2025, 1:31 PM.  INDICATIONS:  swelling across abdomen, abnormal labs- hgb 8.6  TECHNIQUE:  CT scanning was performed from the dome of the diaphragm to the pubic symphysis with non-ionic intravenous contrast material. Post contrast coronal MPR imaging was performed.  Dose reduction techniques were used. Dose information is transmitted to the ACR (American College of Radiology) NRDR (National  Radiology Data Registry) which includes the Dose Index Registry.  PATIENT STATED HISTORY:(As transcribed by Technologist)    CONTRAST USED:   FINDINGS:  LIVER:  Mild hepatic steatosis is noted.  No focal hepatic lesions are identified.  Liver is enlarged measuring up to 22.9 cm. BILIARY:  Gallbladder wall thickening is noted. PANCREAS:  No lesion, fluid collection, ductal dilatation, or atrophy.  SPLEEN:  Spleen is enlarged measuring up to 18.8 cm.  KIDNEYS:  No mass, obstruction, or calcification.  ADRENALS:  No mass or enlargement.  AORTA/VASCULAR:  No aneurysm or dissection.  RETROPERITONEUM:  No mass or adenopathy.  BOWEL/MESENTERY:  No visible mass, obstruction, or bowel wall thickening.  Appendix is normal.  Small amount of free fluid is noted in the pelvis. ABDOMINAL WALL:  No mass or hernia.  URINARY BLADDER:  No visible focal wall thickening, lesion, or calculus.  PELVIC NODES:  No adenopathy.  PELVIC ORGANS:  No visible mass.  Pelvic organs appropriate for patient age.  BONES:  No bony lesion or fracture.  LUNG BASES:  No visible pulmonary or pleural disease.  OTHER:  Negative.             CONCLUSION:  1. Hepatosplenomegaly. 2. Heterogeneous enhancement of the liver is nonspecific.  Correlate with LFTs. 3. Gallbladder wall thickening is nonspecific.  No gallstones are identified.  Clinical correlation is advised.   LOCATION:  [facility]   Dictated by (CST): Marc Goodman MD on 2/02/2025 at 11:14 PM     Finalized by (CST): Marc Goodman MD on 2/02/2025 at 11:19 PM       US ABDOMEN COMPLETE (CPT=76700)    Result Date: 2/2/2025  PROCEDURE:  US ABDOMEN COMPLETE (CPT=76700)  COMPARISON:  None.  INDICATIONS:  swelling across abdomen, abnormal labs- hgb 8.6  TECHNIQUE:  Real time gray-scale ultrasound was used to evaluate the abdomen.  The exam includes images of the liver, gallbladder, common bile duct, pancreas, spleen, kidneys, IVC, and aorta.  PATIENT STATED HISTORY: (As transcribed by Technologist)     FINDINGS:   LIVER:  Diffuse hepatic steatosis is noted.  No focal hepatic lesions are noted. BILIARY:  Sludge in the gallbladder is noted.  There is mild gallbladder wall thickening measuring up to 4 millimeters.  No pericholecystic fluid is noted.  Common bile duct diameter is 5 mm. PANCREAS:  Normal. SPLEEN:  Spleen is enlarged measuring up to 18.3 cm. KIDNEYS:  Normal.  Right kidney measures 11.3 cm.  Left kidney measures 12.5 cm. AORTA/IVC:  Normal.             CONCLUSION:  1. Sludge in the gallbladder with mild gallbladder wall thickening is noted.  There is no pericholecystic fluid.  Negative sonographic Patel sign.  If there is concern for cholecystitis a HIDA may be obtained 2. Diffuse hepatic steatosis. 3. Splenomegaly.   LOCATION:  [facility]    Dictated by (CST): Marc Goodman MD on 2/02/2025 at 9:31 PM     Finalized by (CST): Marc Goodman MD on 2/02/2025 at 9:34 PM       CT ABDOMEN+PELVIS(CONTRAST ONLY)(CPT=74177)    Result Date: 1/13/2025  PROCEDURE:  CT ABDOMEN+PELVIS (CONTRAST ONLY) (CPT=74177)  COMPARISON:  [facility], CT, CT ABDOMEN PELVIS IV CONTRAST, NO ORAL (ER), 12/14/2021, 9:59 PM.  INDICATIONS:  Urinary tract infection, difficulty urinating.  TECHNIQUE:  CT scanning was performed from the dome of the diaphragm to the pubic symphysis with non-ionic intravenous contrast material. Post contrast coronal MPR imaging was performed.  Dose reduction techniques were used. Dose information is transmitted to the ACR (American College of Radiology) NRDR (National Radiology Data Registry) which includes the Dose Index Registry. CONTRAST USED:  100cc of Isovue 370  FINDINGS: LUNG BASE:  Unremarkable. LIVER:  Diffuse fatty infiltration of the liver.  Lobulated contours and heterogeneity of the liver may be related to underlying cirrhosis.  Hepatomegaly measuring up to 23.4 cm craniocaudal dimension. BILIARY:  Unremarkable. SPLEEN:  Splenomegaly measuring up to 16.9 cm in craniocaudal dimension.  Accessory spleen noted.  PANCREAS:  Unremarkable. ADRENALS:  Unremarkable. KIDNEYS:  Unremarkable. AORTA/VASCULAR:  Mild mixed plaque in the aorta and iliac arteries. RETROPERITONEUM:  Unremarkable. BOWEL/MESENTERY:  Unremarkable appendix.  Scattered feces in the colon.  No large or small bowel dilatation.  Minimal free fluid in the pelvis. ABDOMINAL WALL:  Unremarkable. PELVIC ORGANS:  Urinary bladder wall thickening with mild fatty induration is concerning for cystitis.  Clinical correlation recommended.  Unremarkable uterus and ovaries. LYMPH NODES:  No lymphadenopathy in the abdomen or pelvis. BONES:  Degenerative changes in the spine and hips. OTHER:  None.            CONCLUSION:   1. Findings concerning for cystitis.  Clinical correlation recommended.  2. Hepatosplenomegaly with diffuse fatty infiltration of the liver and probable changes of cirrhosis in the liver.  3. Minimal free fluid in the pelvis.   Please see above for further details.  LOCATION:  [facility]   Dictated by (CST): Nj Feldman MD on 1/13/2025 at 1:50 PM     Finalized by (CST): Nj Feldman MD on 1/13/2025 at 1:54 PM          Problem List:    Patient Active Problem List   Diagnosis    Unspecified constipation    External hemorrhoids without mention of complication    Migraine without aura    Anxiety    Depressive disorder, not elsewhere classified    HTN (hypertension)    Hip bursitis right    Gastroesophageal reflux disease without esophagitis    Superior glenoid labrum lesion of right shoulder, sequela    Chronic ethmoidal sinusitis    Chronic maxillary sinusitis    Chronic sphenoidal sinusitis    Urinary tract infection without hematuria, site unspecified    Cirrhosis of liver without ascites, unspecified hepatic cirrhosis type (HCC)    Hyponatremia    Anemia    Hyperglycemia    Abdominal pain, acute    Dark stools    Abnormal gallbladder ultrasound    Elevated liver enzymes       Impression:    48 y/o with abdominal pain  No ETOH since Oct 2024  No  leukocytosis  Bili 2.4    Plan:    Will await for MRCP   NPO ice chips  IV fluids  Reviewed with patient possibly order HIDA scan pending imaging   Reviewed should she need surgical management timing to be determined by Dr Gómez  All questions answered  Patient seen and examined with EDGARDO Mantilla  [facility]  2/3/2025         [1]   Allergies  Allergen Reactions    Amlodipine SWELLING    Ace Inhibitors Coughing   [2]   Current Facility-Administered Medications on File Prior to Encounter   Medication Dose Route Frequency Provider Last Rate Last Admin    [COMPLETED] furosemide (Lasix) 10 mg/mL injection 40 mg  40 mg Intravenous Once Juma Andrews, DO   40 mg at 25 1219    [COMPLETED] iopamidol 76% (ISOVUE-370) injection for power injector  100 mL Intravenous ONCE PRN Valeriy Mendoza MD   100 mL at 25 1335    [COMPLETED] cefTRIAXone (Rocephin) 2 g in sodium chloride 0.9% 100 mL IVPB-ADDV  2 g Intravenous Once Valeriy Mendoza MD   Stopped at 25 1539    [] ondansetron (Zofran) 4 MG/2ML injection 4 mg  4 mg Intravenous Q4H PRN Valeriy Mendoza MD        [] sodium chloride 0.9% IVPB             [COMPLETED] ondansetron (Zofran) 4 MG/2ML injection 4 mg  4 mg Intravenous Once Valeriy Mendoza MD   4 mg at 25 1519    [COMPLETED] potassium chloride (Klor-Con M20) tab 40 mEq  40 mEq Oral Q4H Sarah Kimball MD   40 mEq at 25 2201     Current Outpatient Medications on File Prior to Encounter   Medication Sig Dispense Refill    levocetirizine 5 MG Oral Tab Take 1 tablet (5 mg total) by mouth every morning.      ALPRAZolam 0.25 MG Oral Tab Take 1 tablet (0.25 mg total) by mouth nightly as needed.      metoclopramide 10 MG Oral Tab Take 1 tablet (10 mg total) by mouth 3 (three) times daily as needed (nausea).      LOSARTAN 100 MG Oral Tab TAKE 1 TABLET BY MOUTH ONCE DAILY 90 tablet 0    OMEPRAZOLE 20  MG Oral Capsule Delayed Release TAKE ONE CAPSULE BY MOUTH ONCE DAILY 90 capsule 0    ondansetron (ZOFRAN) 4 mg tablet Take 1 tablet (4 mg total) by mouth every 8 (eight) hours as needed for Nausea.

## 2025-02-04 VITALS
WEIGHT: 183 LBS | DIASTOLIC BLOOD PRESSURE: 69 MMHG | SYSTOLIC BLOOD PRESSURE: 119 MMHG | HEART RATE: 90 BPM | RESPIRATION RATE: 16 BRPM | TEMPERATURE: 98 F | OXYGEN SATURATION: 98 % | HEIGHT: 65 IN | BODY MASS INDEX: 30.49 KG/M2

## 2025-02-04 LAB
ALBUMIN SERPL-MCNC: 3.4 G/DL (ref 3.2–4.8)
ALBUMIN/GLOB SERPL: 1.4 {RATIO} (ref 1–2)
ALP LIVER SERPL-CCNC: 149 U/L
ALT SERPL-CCNC: 20 U/L
ANION GAP SERPL CALC-SCNC: 12 MMOL/L (ref 0–18)
AST SERPL-CCNC: 174 U/L (ref ?–34)
BASOPHILS # BLD AUTO: 0.05 X10(3) UL (ref 0–0.2)
BASOPHILS NFR BLD AUTO: 0.8 %
BILIRUB SERPL-MCNC: 2.3 MG/DL (ref 0.3–1.2)
BUN BLD-MCNC: <5 MG/DL (ref 9–23)
CALCIUM BLD-MCNC: 8.8 MG/DL (ref 8.7–10.6)
CHLORIDE SERPL-SCNC: 104 MMOL/L (ref 98–112)
CO2 SERPL-SCNC: 21 MMOL/L (ref 21–32)
CREAT BLD-MCNC: 0.68 MG/DL
EGFRCR SERPLBLD CKD-EPI 2021: 106 ML/MIN/1.73M2 (ref 60–?)
EOSINOPHIL # BLD AUTO: 0.13 X10(3) UL (ref 0–0.7)
EOSINOPHIL NFR BLD AUTO: 2.1 %
ERYTHROCYTE [DISTWIDTH] IN BLOOD BY AUTOMATED COUNT: 15.7 %
GLOBULIN PLAS-MCNC: 2.5 G/DL (ref 2–3.5)
GLUCOSE BLD-MCNC: 100 MG/DL (ref 70–99)
HCT VFR BLD AUTO: 25 %
HGB BLD-MCNC: 8.5 G/DL
IMM GRANULOCYTES # BLD AUTO: 0.02 X10(3) UL (ref 0–1)
IMM GRANULOCYTES NFR BLD: 0.3 %
LYMPHOCYTES # BLD AUTO: 0.95 X10(3) UL (ref 1–4)
LYMPHOCYTES NFR BLD AUTO: 15.4 %
MCH RBC QN AUTO: 35.4 PG (ref 26–34)
MCHC RBC AUTO-ENTMCNC: 34 G/DL (ref 31–37)
MCV RBC AUTO: 104.2 FL
MONOCYTES # BLD AUTO: 0.71 X10(3) UL (ref 0.1–1)
MONOCYTES NFR BLD AUTO: 11.5 %
NEUTROPHILS # BLD AUTO: 4.3 X10 (3) UL (ref 1.5–7.7)
NEUTROPHILS # BLD AUTO: 4.3 X10(3) UL (ref 1.5–7.7)
NEUTROPHILS NFR BLD AUTO: 69.9 %
PLATELET # BLD AUTO: 125 10(3)UL (ref 150–450)
POTASSIUM SERPL-SCNC: 3.7 MMOL/L (ref 3.5–5.1)
PROT SERPL-MCNC: 5.9 G/DL (ref 5.7–8.2)
RBC # BLD AUTO: 2.4 X10(6)UL
SODIUM SERPL-SCNC: 137 MMOL/L (ref 136–145)
WBC # BLD AUTO: 6.2 X10(3) UL (ref 4–11)

## 2025-02-04 PROCEDURE — 80053 COMPREHEN METABOLIC PANEL: CPT | Performed by: HOSPITALIST

## 2025-02-04 PROCEDURE — 85025 COMPLETE CBC W/AUTO DIFF WBC: CPT | Performed by: HOSPITALIST

## 2025-02-04 RX ORDER — HYDROCODONE BITARTRATE AND ACETAMINOPHEN 5; 325 MG/1; MG/1
2 TABLET ORAL EVERY 4 HOURS PRN
Status: DISCONTINUED | OUTPATIENT
Start: 2025-02-04 | End: 2025-02-04

## 2025-02-04 RX ORDER — TRAMADOL HYDROCHLORIDE 25 MG/1
25 TABLET, COATED ORAL EVERY 6 HOURS PRN
Qty: 8 TABLET | Refills: 0 | Status: SHIPPED | OUTPATIENT
Start: 2025-02-04

## 2025-02-04 RX ORDER — HYDROCODONE BITARTRATE AND ACETAMINOPHEN 5; 325 MG/1; MG/1
1 TABLET ORAL EVERY 4 HOURS PRN
Status: DISCONTINUED | OUTPATIENT
Start: 2025-02-04 | End: 2025-02-04

## 2025-02-04 NOTE — PROGRESS NOTES
A&Ox4. VSS. RA. .  Telemetry: NSR  GI: Abdomen soft, nondistended.   Denies nausea.  : Voids.  Pain controlled with PRN pain medications  Up ad dora.  Diet:clears, advanced to low fat  IVF running per order.  All appropriate safety measures in place. All questions and concerns addressed. Will continue to monitor

## 2025-02-04 NOTE — DISCHARGE SUMMARY
[facility] Hospitalist Discharge Summary     Patient ID:  Cleopatra Miller  49 year old  8/2/1975    Admit date: 2/2/2025    Discharge date and time: 02/04/25     Attending Physician: Tin Dodson*     Primary Care Physician: Freddy Fuller MD     Discharge Diagnoses: Elevated liver enzymes [R74.8]  Dark stools [R19.5]  Abdominal pain, acute [R10.9]  Abnormal gallbladder ultrasound [R93.2]    Please note that only IHP DMG and EMG patients enrolled in the Medicare ACO, BCBS ACO and BCBS HMOs will be handled by the [facility] Care Management team.  For all other patients, please follow usual protocol for discharge care transition.    Discharge Condition: stable    Disposition:  home    Important Follow up:  - PCP within 2 weeks       Follow-up Information       Rahul Gómez MD Follow up in 2 week(s).    Specialty: SURGERY, GENERAL  Contact information:  [address]  [address]  [place] [address]  [phone]               Cong Leahy MD Follow up.    Specialty: GASTROENTEROLOGY  Contact information:  [address]  [address]  [place] [address]  [phone]                                 Hospital Course:        49 year old female with PMH sig for alcoholic cirrhosis, HTN, anxiety/depression p/w abdominal pain and dark stools. Last etoh drink was in October. She's been feeling increased abdominal swelling over the last few weeks. Also has intermittent sharp pain in RUQ that radiates to her back. This causes poor appetite and dimished oral intake. Also reporting some very dark stools recently, more loose than regular. She does take iron supplements for anemia. Also reports that after she urinated she saw the toilet was full of blood.  In the ED VSS. Labs with hyponatremia, T.bili 2.6, elevated LFTs, macrocytic anemia of 9.1. Abd US with gallbladder sludege and mild thickening along with splenomegaly. CT A/P with hepatosplenomegaly and  nonspecific GB thickening. GI and surgery consulted and she was admitted for further evaluation and treatment.     Abdominal Pain, likely from acute hepatitis and liver capsule stretching  - US/CT/MRCP with no evidence of biliary obstruction  - GI/Surgery with no plans of acute intervention  - OP GI f/u   - tolerating advanced diet  - supportive cares, prn analgesics/antiemetics      Alcoholic Cirrhosis  - elevated LFTs, bili  - trend  - GI following  - monitor for evidence of portal HTN  - OP GI f/u tomorrow     Macrocytic anemia  Thrombocytopenia  - stable     Essential HTN  - resume losartan on dc         Consults: IP CONSULT TO HOSPITALIST  NURSING CONSULT TO DIETITIAN  IP CONSULT TO GASTROENTEROLOGY  IP CONSULT TO GENERAL SURGERY    Operative Procedures:        Patient instructions:      I as the attending physician reconciled the current and discharge medications on day of discharge.     Current Discharge Medication List        START taking these medications    Details   traMADol HCl 25 MG Oral Tab Take 25 mg by mouth every 6 (six) hours as needed.      Naloxone HCl 4 MG/0.1ML Nasal Liquid 4 mg by Nasal route as needed. If patient remains unresponsive, repeat dose in other nostril 2-5 minutes after first dose.           CONTINUE these medications which have NOT CHANGED    Details   levocetirizine 5 MG Oral Tab Take 1 tablet (5 mg total) by mouth every morning.      ALPRAZolam 0.25 MG Oral Tab Take 1 tablet (0.25 mg total) by mouth nightly as needed.      metoclopramide 10 MG Oral Tab Take 1 tablet (10 mg total) by mouth 3 (three) times daily as needed (nausea).      LOSARTAN 100 MG Oral Tab TAKE 1 TABLET BY MOUTH ONCE DAILY      OMEPRAZOLE 20 MG Oral Capsule Delayed Release TAKE ONE CAPSULE BY MOUTH ONCE DAILY      ondansetron (ZOFRAN) 4 mg tablet Take 1 tablet (4 mg total) by mouth every 8 (eight) hours as needed for Nausea.             Activity: activity as tolerated  Diet: regular diet  Wound Care: as  directed  Code Status: Prior      Discharge Exam:     General: no acute distress, alert and oriented x 3  Heart: RRR  Lungs: clear bilaterally, no active wheezing  Abdomen: nontender, nondistended, intact BS  Extremities: no pedal edema   Neuro: CN inact, no focal deficits      Total time coordinating care for discharge: Greater than 30 minutes    Petey Dodson MD  [facility]

## 2025-02-04 NOTE — PROGRESS NOTES
[facility]  Progress Note    Cleopatra Miller Patient Status:  Inpatient    1975 MRN [id number]   Location [facility] 3NW-A Attending Tin Dodson*   Hosp Day # 1 PCP Freddy Fuller MD     Subjective:    Patient reports tolerating PO diet, she reports her pain is approx 4 though waxes/wanes    Objective/Physical Exam:    Vital Signs:  Blood pressure 134/84, pulse 86, temperature 97.4 °F (36.3 °C), temperature source Oral, resp. rate 18, height 5' 5\" (1.651 m), weight 183 lb (83 kg), SpO2 98%, not currently breastfeeding.    General:  Alert, orientated x3.  Cooperative.  No apparent distress.    Lungs:  Non labored breathing    Cardiac:  Regular rate and rhythm.     Abdomen:  soft, non tender, mildly distended     Extremities:  No lower extremity edema noted.  Without clubbing or cyanosis.        Labs:  Reviewed    Lab Results   Component Value Date    WBC 6.2 2025    HGB 8.5 2025    HCT 25.0 2025    .0 2025    CREATSERUM 0.68 2025    BUN <5 2025     2025    K 3.7 2025     2025    CO2 21.0 2025     2025    CA 8.8 2025    ALB 3.4 2025    ALKPHO 149 2025    BILT 2.3 2025    TP 5.9 2025     2025    ALT 20 2025    INR 1.38 2025    PTP 17.1 2025       Xray:  Reviewed    Problem List:  Patient Active Problem List   Diagnosis    Unspecified constipation    External hemorrhoids without mention of complication    Migraine without aura    Anxiety    Depressive disorder, not elsewhere classified    HTN (hypertension)    Hip bursitis right    Gastroesophageal reflux disease without esophagitis    Superior glenoid labrum lesion of right shoulder, sequela    Chronic ethmoidal sinusitis    Chronic maxillary sinusitis    Chronic sphenoidal sinusitis    Urinary tract infection without hematuria, site unspecified    Cirrhosis of liver without ascites,  unspecified hepatic cirrhosis type (HCC)    Hyponatremia    Anemia    Hyperglycemia    Abdominal pain, acute    Dark stools    Abnormal gallbladder ultrasound    Elevated liver enzymes           Impression:     50 y/o with abdominal pain  H/o ETOH reports non since Oct 2024  No leukocytosis  Unchanged liver enzymes  MRI reviewed with patient, mild ascites, hepatosplenomegaly, no ductal dilation mild gb wall thickening     Plan:    Abdominal pain likely due to enlarged liver, no plans for acute surgical management  Diet as tolerated  Continue management per GI  Can follow up as outpatient  Will sign off  EDGARDO Alanis DR  [facility]  General Surgery  2/4/2025

## 2025-02-04 NOTE — PLAN OF CARE
A&Ox4. VSS. RA. . Denies chest pain and SOB.   Telemetry: NSR.   GI: Abdomen soft, nondistended. Tenderness Passing gas.   Denies nausea.   : Voids.   Pain controlled with PRN pain medications.   Up ad dora  Diet: Clears  IVF running per order.   All appropriate safety measures in place. All questions and concerns addressed.

## 2025-02-04 NOTE — CONSULTS
[facility]   part of [facility]    Report of Gastroenterology Consultation    Cleopatra Miller Patient Status:  Inpatient    1975 MRN [id number]   Location [facility] 3NW-A Attending Tin Dodson*   Hosp Day # 0 PCP Freddy Fuller MD     Date of Admission:  2025  Date of Consult:   2/3/2025    Reason for Consultation:    Abdominal pain, elevated liver tests    History of Present Illness:  Cleopatra Miller is a a(n) 49 year old female.     Hx of alcohol abuse - abstinence per patient since Oct 2024.  Had hx of elevated liver tests and abdominal pain.  Had EGD/ EUS with me that showed no CBD stone but hepatic steatosis.  Liver biopsies showed grade 1, stage 1 disease.      Admitted for recurrent upper abdominal pain.  Was admitted 2 weeks ago with jaundice, fever of 102-103, chills, cough, elevated liver test - Tbili 6.  AST>> ALT.  pETH ++.  CT showed nodular liver, normal GB without biliary ductal dilation - was sent home on ? Augmentin for cough.      Readmitted for persistent abdominal pain and elevated liver tests.  Tbili improved.     US showed sludge and mild wall thickening.  CT without gallstones, hepatosplenomegaly, fatty liver    Still with mild upper abdominal pain    History:  Past Medical History:    Alcoholic hepatitis (HCC)    ANXIETY    Anxiety state    CHICKEN POX    MIKE I (cervical intraepithelial neoplasia I)    Cirrhosis of liver (HCC)    DEPRESSION    postpartum on wellbutrin    Depression    Essential hypertension    FHx: migraine headaches    Gestational diabetes (HCC)    insulin controlled    High blood pressure    HPV in female    HYPERTENSION    Hypokalemia    Hyponatremia    IUD (intrauterine device) in place    Mirena Placed for Heavy Menses    Migraines    Mild dysplasia of cervix    on colpo    Visual impairment    readers     Past Surgical History:   Procedure Laterality Date          x1    Colonoscopy  2017    7 mm polyp (heavily  cauterized, favoring hyperplastic), int hemorrhoids, repeat     Colonoscopy  2021    Left sided diverticulosis, Normal colon and TI mucosa. small int hemorrhoids    Colposcopy,bx cervix/endocerv curr  10/29/2010    MIKE 1    Colposcopy,bx cervix/endocerv curr  2012     MIKE 1    Correct bunion,othr methods      with bone rotation    Egd  2021    Mild gastritis, small hiatal hernia, nonobstructive schatzki's ring s/p biopsies, normal duodenum          x 3    Outer ear surgery proc unlisted      Tubal ligation       Family History   Problem Relation Age of Onset    Cancer Father         kidney,prostate,bladder    Diabetes Father     Hypertension Father     Hypertension Paternal Grandmother     Other (Other) Paternal Grandmother     Heart Disorder Maternal Grandmother     Other (Other) Maternal Grandmother     Heart Disorder Maternal Grandfather         MI    Other (Other) Paternal Grandfather         stroke    Breast Cancer Sister 50        Dx Breast CA age 50.    Other (Other) Sister         eczema, crohn's disease    Lipids Neg     Obesity Neg     Psychiatric Neg       reports that she has never smoked. She has never used smokeless tobacco. She reports current alcohol use of about 1.0 - 4.0 standard drink of alcohol per week. She reports that she does not use drugs.    Allergies:  Allergies[1]    Medications:    Current Facility-Administered Medications:     influenza virus trivalent PF (Fluzone Trivalent) 0.5 mL IM injection (ages 6 months to 64 years) 0.5 mL, 0.5 mL, Intramuscular, Prior to discharge    sodium chloride 0.9% infusion, , Intravenous, Continuous    ALPRAZolam (Xanax) tab 0.25 mg, 0.25 mg, Oral, Nightly PRN    [Held by provider] losartan (Cozaar) tab 100 mg, 100 mg, Oral, Daily    HYDROmorphone (Dilaudid) 1 MG/ML injection 0.2 mg, 0.2 mg, Intravenous, Q2H PRN **OR** HYDROmorphone (Dilaudid) 1 MG/ML injection 0.4 mg, 0.4 mg, Intravenous, Q2H PRN **OR** HYDROmorphone  (Dilaudid) 1 MG/ML injection 0.8 mg, 0.8 mg, Intravenous, Q2H PRN    LORazepam (Ativan) 2 mg/mL injection 0.5 mg, 0.5 mg, Intravenous, Once    cetirizine (ZyrTEC) tab 10 mg, 10 mg, Oral, Daily    heparin (Porcine) 5000 UNIT/ML injection 5,000 Units, 5,000 Units, Subcutaneous, 2 times per day    pantoprazole (Protonix) DR tab 40 mg, 40 mg, Oral, QAM AC    Review of Systems:  Gastrointestinal: Denies positive test for blood stool, heartburn/indigestion/reflux, belching, difficulty swallowing, irregular bowel habits, painful swallowing, diarrhea, abdominal pain, constipation, nausea, incontinence of stool, vomiting, black stools, get full quickly at meals, blood in stools, abdominal distention, jaundice, flatulence, vomiting blood, bloating, hernia, laxative use, food/milk intolerance, pain with bowel movement, hemorrhoids.  General: Denies fatigue, chills/fever, night sweats, weight loss, loss of appetite, weight gain, sleep disturbance.  Neurological: Denies frequent headaches, history of stroke, recent passing out, recent dizziness, convulsions/seizures, dementia.  Cardiovascular: Denies history of heart murmur, leg swelling, history of rheumatic fever, pacemaker, chest pain or pressure after eating or when upset, automatic defibrillator, angina, other implanted devices, irregular heart rate/palpitations, high cholesterol or triglycerides, coronary stents.  Respiratory: Denies shortness of breath, chronic/frequent hoarseness, wheezing, exposure to tuberculosis, chronic cough, spitting up blood, cough up sputum, sleep apnea.  Genitourinary: Denies kidney stones, painful/difficult urination, frequent urinary infections, frequent urination, blood in urine, incontinence, kidney failure, prostate problems.  Endocrine: Denies thyroid disease, denies diabetes.  Female complaints: Denies endometriosis, painful menstrual periods, heavy menstrual periods.  Patient is not pregnant.  Psychosocial: Denies history of mental  illness, denies usually feeling lonely or depressed, denies history of depression, anxiety, history of physical or sexual abuse, stress, history of eating disorder.  Skin: Denies severe itching, unusual moles, rash, flushing, change in hair or nails.  Bone/joint: Denies arthritis, back pain, joint pain, osteoporosis.  Heme/Lymphatic: Denies easy bruising, anemia, excessive bleeding, enlarging or painful lymph nodes.  Allergy: Denies medication allergy, latex/rubber allergy, anaphylactic or other reaction to anesthesia, food allergy.   Eyes: Denies blurred/double vision, eye disease, glasses or contacts, glaucoma.  ENT: Denies nose or gums bleeding, mouth sores, bad breath or bad taste in mouth, hearing loss.  Physical Exam:    Blood pressure 121/70, pulse 87, temperature 98.2 °F (36.8 °C), temperature source Oral, resp. rate 18, height 5' 5\" (1.651 m), weight 183 lb (83 kg), SpO2 98%, not currently breastfeeding.    General: Appears alert, oriented x3 and in no acute distress.  HEENT: Normal. No neck vein distention. Thyroid not enlarged.  No lymphadenopathy.  CV: S1 and S2 normal.  No murmurs or gallops.  Lungs: Clear to auscultation.  Abdomen: Soft and nondistended.  Mild epigastric tenderness.  No masses.  Bowel sounds are present.  Back: No CVA tenderness.  Extremities: No edema, cyanosis, or clubbing.  Skin: Warm    Laboratory Data:  Lab Results   Component Value Date    WBC 6.9 02/03/2025    HGB 8.4 02/03/2025    HCT 23.9 02/03/2025    .0 02/03/2025    CREATSERUM 0.63 02/03/2025    BUN 6 02/03/2025     02/03/2025    K 3.6 02/03/2025     02/03/2025    CO2 22.0 02/03/2025    GLU 97 02/03/2025    CA 9.0 02/03/2025    ALB 3.6 02/03/2025    ALKPHO 165 02/03/2025    BILT 2.4 02/03/2025    TP 6.3 02/03/2025     02/03/2025    ALT 22 02/03/2025    INR 1.38 02/03/2025       Imaging:       Assessment/Plan:    Upper abdominal pain, elevated liver tests.  Per US and CT there is no evidence of  biliary obstruction.  Pain could be related acute hepatitis and liver capsule stretch.  Will check MRI for CBD stone.      Elevated liver test- pattern of AST>ALT is concerning for persistent alcohol abuse zackary in light of elevated pETH even though patient denies    Checking autoimmune , viral and metabolic liver panel.    CLD for now.        Patient Active Problem List   Diagnosis    Unspecified constipation    External hemorrhoids without mention of complication    Migraine without aura    Anxiety    Depressive disorder, not elsewhere classified    HTN (hypertension)    Hip bursitis right    Gastroesophageal reflux disease without esophagitis    Superior glenoid labrum lesion of right shoulder, sequela    Chronic ethmoidal sinusitis    Chronic maxillary sinusitis    Chronic sphenoidal sinusitis    Urinary tract infection without hematuria, site unspecified    Cirrhosis of liver without ascites, unspecified hepatic cirrhosis type (HCC)    Hyponatremia    Anemia    Hyperglycemia    Abdominal pain, acute    Dark stools    Abnormal gallbladder ultrasound    Elevated liver enzymes             José Dallas MD  2/3/2025  8:35 PM         [1]   Allergies  Allergen Reactions    Amlodipine SWELLING    Ace Inhibitors Coughing

## 2025-02-05 LAB
ACTIN SMOOTH MUSCLE AB: 5 UNITS
CMV IGG AB: 4.2 U/ML
CMV IGM AB: <30 AU/ML
EBV NA IGG SER QL IA: POSITIVE
EBV VCA IGG SER QL IA: POSITIVE
EBV VCA IGM SER QL IA: NEGATIVE
LIVER-KIDNEY MICRO AB: 3.4 UNITS
M2 MITOCHONDRIAL AB: <20 UNITS

## 2025-02-05 NOTE — PAYOR COMM NOTE
--------------  DISCHARGE REVIEW    Payor: [facility]/HMO/POS/EPO  Subscriber #:  [id number]  Authorization Number: [id number]    Admit date: 2/3/25  Admit time:  12:27 AM  Discharge Date: 2/4/2025 12:59 PM     Admitting Physician: Ant Magana DO  Attending Physician:  No att. providers found  Primary Care Physician: Freddy Fuller MD          Discharge Summary Notes        Discharge Summary signed by Tin Dodson MD at 2/4/2025 12:47 PM       Author: Tin Dodson MD Specialty: Internal Medicine Author Type: Physician    Filed: 2/4/2025 12:47 PM Date of Service: 2/4/2025 12:45 PM Status: Signed    : Tin Dodson MD (Physician)                                                          [facility]     Patient ID:  Cleopatra Miller  49 year old  8/2/1975    Admit date: 2/2/2025    Discharge date and time: 02/04/25     Attending Physician: Tin Dodson*     Primary Care Physician: Freddy Fuller MD     Discharge Diagnoses: Elevated liver enzymes [R74.8]  Dark stools [R19.5]  Abdominal pain, acute [R10.9]  Abnormal gallbladder ultrasound [R93.2]    Please note that only IHP DMG and EMG patients enrolled in the Medicare ACO, [facility] ACO and [facility] HMOs will be handled by the [facility] Care Management team.  For all other patients, please follow usual protocol for discharge care transition.    Discharge Condition: stable    Disposition:  home    Important Follow up:  - PCP within 2 weeks       Follow-up Information       Rahul Gómez MD Follow up in 2 week(s).    Specialty: SURGERY, GENERAL  Contact information:  [address]  [facility] [address]  [phone]               Cong Leahy MD Follow up.    Specialty: GASTROENTEROLOGY  Contact information:  [address]  [facility] [address]  [phone]                                 Hospital Course:        49 year old female with  PMH sig for alcoholic cirrhosis, HTN, anxiety/depression p/w abdominal pain and dark stools. Last etoh drink was in October. She's been feeling increased abdominal swelling over the last few weeks. Also has intermittent sharp pain in RUQ that radiates to her back. This causes poor appetite and dimished oral intake. Also reporting some very dark stools recently, more loose than regular. She does take iron supplements for anemia. Also reports that after she urinated she saw the toilet was full of blood.  In the ED VSS. Labs with hyponatremia, T.bili 2.6, elevated LFTs, macrocytic anemia of 9.1. Abd US with gallbladder sludege and mild thickening along with splenomegaly. CT A/P with hepatosplenomegaly and nonspecific GB thickening. GI and surgery consulted and she was admitted for further evaluation and treatment.     Abdominal Pain, likely from acute hepatitis and liver capsule stretching  - US/CT/MRCP with no evidence of biliary obstruction  - GI/Surgery with no plans of acute intervention  - OP GI f/u   - tolerating advanced diet  - supportive cares, prn analgesics/antiemetics      Alcoholic Cirrhosis  - elevated LFTs, bili  - trend  - GI following  - monitor for evidence of portal HTN  - OP GI f/u tomorrow     Macrocytic anemia  Thrombocytopenia  - stable     Essential HTN  - resume losartan on dc         Consults: IP CONSULT TO HOSPITALIST  NURSING CONSULT TO DIETITIAN  IP CONSULT TO GASTROENTEROLOGY  IP CONSULT TO GENERAL SURGERY    Operative Procedures:        Patient instructions:      I as the attending physician reconciled the current and discharge medications on day of discharge.     Current Discharge Medication List        START taking these medications    Details   traMADol HCl 25 MG Oral Tab Take 25 mg by mouth every 6 (six) hours as needed.      Naloxone HCl 4 MG/0.1ML Nasal Liquid 4 mg by Nasal route as needed. If patient remains unresponsive, repeat dose in other nostril 2-5 minutes after first dose.            CONTINUE these medications which have NOT CHANGED    Details   levocetirizine 5 MG Oral Tab Take 1 tablet (5 mg total) by mouth every morning.      ALPRAZolam 0.25 MG Oral Tab Take 1 tablet (0.25 mg total) by mouth nightly as needed.      metoclopramide 10 MG Oral Tab Take 1 tablet (10 mg total) by mouth 3 (three) times daily as needed (nausea).      LOSARTAN 100 MG Oral Tab TAKE 1 TABLET BY MOUTH ONCE DAILY      OMEPRAZOLE 20 MG Oral Capsule Delayed Release TAKE ONE CAPSULE BY MOUTH ONCE DAILY      ondansetron (ZOFRAN) 4 mg tablet Take 1 tablet (4 mg total) by mouth every 8 (eight) hours as needed for Nausea.             Activity: activity as tolerated  Diet: regular diet  Wound Care: as directed  Code Status: Prior      Discharge Exam:     General: no acute distress, alert and oriented x 3  Heart: RRR  Lungs: clear bilaterally, no active wheezing  Abdomen: nontender, nondistended, intact BS  Extremities: no pedal edema   Neuro: CN inact, no focal deficits      Total time coordinating care for discharge: Greater than 30 minutes    Petey Dodson MD  [facility]        Electronically signed by Tin Dodson MD on 2/4/2025 12:47 PM         REVIEWER COMMENTS

## 2025-02-07 LAB — NUCLEAR IGG TITR SER IF: NEGATIVE {TITER}

## 2025-02-10 LAB
PARVO B19 IGG: 0.1 INDEX
PARVO B19 IGM: 0.1 INDEX
PHOSPHATIDYETHANOL: POSITIVE
PHOSPHATIDYLETHANOL (PETH): 24 NG/ML

## 2025-02-17 LAB — A-1-ANTITRYPSIN: 197 MG/DL

## 2025-03-09 ENCOUNTER — OFFICE VISIT (OUTPATIENT)
Dept: FAMILY MEDICINE CLINIC | Facility: CLINIC | Age: 50
End: 2025-03-09
Payer: COMMERCIAL

## 2025-03-09 VITALS
SYSTOLIC BLOOD PRESSURE: 138 MMHG | HEIGHT: 65 IN | WEIGHT: 173 LBS | RESPIRATION RATE: 18 BRPM | DIASTOLIC BLOOD PRESSURE: 78 MMHG | HEART RATE: 92 BPM | BODY MASS INDEX: 28.82 KG/M2 | TEMPERATURE: 98 F | OXYGEN SATURATION: 99 %

## 2025-03-09 DIAGNOSIS — R68.89 FLU-LIKE SYMPTOMS: ICD-10-CM

## 2025-03-09 DIAGNOSIS — J02.9 SORE THROAT: Primary | ICD-10-CM

## 2025-03-09 LAB
CONTROL LINE PRESENT WITH A CLEAR BACKGROUND (YES/NO): YES YES/NO
KIT LOT #: NORMAL NUMERIC

## 2025-03-09 PROCEDURE — 87637 SARSCOV2&INF A&B&RSV AMP PRB: CPT | Performed by: FAMILY MEDICINE

## 2025-03-09 NOTE — PATIENT INSTRUCTIONS
Your testing will be back in 24-36 hours.    Use OTC meds for comfort as needed--  Ibuprofen/Tylenol for fever/pain  Use Benadryl at bedtime to reduce drainage and promote rest.  Zyrtec/Claritin/Allegra in the AM to reduce nasal drainage without sedation.   Use saline nasal sprays to reduce congestion and thin secretions.   Use Delsym for cough.   Consider applying ivory's vapo-rub or eucayptus oil to chest and feet at bedtime to reduce chest and nasal congestion.   Warm tea with honey, cough lozenges, vaporizers/steam etc.    If your testing is negative follow-up with your PCP for further evaluation of your long term cough and recent worsening symptoms.

## 2025-03-09 NOTE — PROGRESS NOTES
CHIEF COMPLAINT:     Chief Complaint   Patient presents with    Cold     Cough. Since January   Low grade fever (101.5 -99) , vomiting , nausea, loss of appetite and sore throat. Started on Monday   OTC: Dayquill   No exposure          HPI:   Cleopatra Miller is a 49 year old female presents to clinic with complaints of cough, fever, sore throat x 6 days.   Reports + chills, + fever all week, 101.5 last night, + headache, no upset stomach, no ear pain, no rash, no diarrhea, no loss of smell/taste.    COVID exposure: none known  Sick contacts: none  COVID testing: none  Taking Dayquil  Pt reports \"chronic cough\" since January.  Has been taking xyzal and omeprazole daily.       Current Outpatient Medications   Medication Sig Dispense Refill    traMADol HCl 25 MG Oral Tab Take 25 mg by mouth every 6 (six) hours as needed. 8 tablet 0    Naloxone HCl 4 MG/0.1ML Nasal Liquid 4 mg by Nasal route as needed. If patient remains unresponsive, repeat dose in other nostril 2-5 minutes after first dose. 1 kit 0    levocetirizine 5 MG Oral Tab Take 1 tablet (5 mg total) by mouth every morning.      ALPRAZolam 0.25 MG Oral Tab Take 1 tablet (0.25 mg total) by mouth nightly as needed.      metoclopramide 10 MG Oral Tab Take 1 tablet (10 mg total) by mouth 3 (three) times daily as needed (nausea).      ondansetron (ZOFRAN) 4 mg tablet Take 1 tablet (4 mg total) by mouth every 8 (eight) hours as needed for Nausea.      LOSARTAN 100 MG Oral Tab TAKE 1 TABLET BY MOUTH ONCE DAILY 90 tablet 0    OMEPRAZOLE 20 MG Oral Capsule Delayed Release TAKE ONE CAPSULE BY MOUTH ONCE DAILY 90 capsule 0      Past Medical History:    Alcoholic hepatitis (HCC)    ANXIETY    Anxiety state    CHICKEN POX    MIKE I (cervical intraepithelial neoplasia I)    Cirrhosis of liver (HCC)    DEPRESSION    postpartum on wellbutrin    Depression    Essential hypertension    FHx: migraine headaches    Gestational diabetes (HCC)    insulin controlled    High blood  pressure    HPV in female    HYPERTENSION    Hypokalemia    Hyponatremia    IUD (intrauterine device) in place    Mirena Placed for Heavy Menses    Migraines    Mild dysplasia of cervix    on colpo    Visual impairment    readers      Social History:  Social History     Socioeconomic History    Marital status:    Tobacco Use    Smoking status: Never    Smokeless tobacco: Never   Vaping Use    Vaping status: Never Used   Substance and Sexual Activity    Alcohol use: Yes     Alcohol/week: 1.0 - 4.0 standard drink of alcohol     Types: 1 - 4 Shots of liquor per week    Drug use: No    Sexual activity: Yes     Partners: Male     Birth control/protection: Tubal Ligation, Vasectomy     Social Drivers of Health     Food Insecurity: No Food Insecurity (2/3/2025)    Food Insecurity     Food Insecurity: Never true   Transportation Needs: No Transportation Needs (2/3/2025)    Transportation Needs     Lack of Transportation: No   Housing Stability: Low Risk  (2/3/2025)    Housing Stability     Housing Instability: No        REVIEW OF SYSTEMS:   GENERAL HEALTH: feels well otherwise, normal appetite  SKIN: denies any unusual skin lesions or rashes  HEENT: See HPI  RESPIRATORY: denies shortness of breath or wheezing  CARDIOVASCULAR: denies chest pain or palpitations   GI: denies vomiting or diarrhea  NEURO: denies dizziness or lightheadedness    EXAM:   /78 (BP Location: Right arm, Patient Position: Sitting)   Pulse 92   Temp 97.9 °F (36.6 °C) (Temporal)   Resp 18   Ht 5' 5\" (1.651 m)   Wt 173 lb (78.5 kg)   LMP  (LMP Unknown)   SpO2 99%   BMI 28.79 kg/m²   GENERAL: well developed, well nourished, in no apparent distress  SKIN: no rashes,no suspicious lesions  HEAD: atraumatic, normocephalic  EYES: conjunctiva clear  EARS: TM's clear, non-injected, no bulging, retraction, or fluid bilaterally   NOSE: nostrils patent, clear nasal mucus, nasal mucosa pink and boggy  THROAT: oral mucosa pink, moist. Posterior  pharynx erythematous and injected. No exudates. Tonsils 2/4.  Uvula is midline.  Breath is not malodorous.  No trismus, hoarseness, muffled voice, or stridor.    NECK: supple  LUNGS: clear to auscultation bilaterally. Breathing is non labored.  CARDIO: RRR without murmur  EXTREMITIES: no cyanosis, clubbing or edema  LYMPH: no anterior cervical lymphadenopathy, no submandibular lymphadenopathy.  No  posterior cervical or occipital lymphadenopathy.    Recent Results (from the past 24 hours)   Strep A Assay W/Optic    Collection Time: 03/09/25  8:16 AM   Result Value Ref Range    Strep Grp A Screen neg Negative    Control Line Present with a clear background (yes/no) yes Yes/No    Kit Lot # [id number] Numeric    Kit Expiration Date 12/20/25 Date           ASSESSMENT AND PLAN:     Encounter Diagnoses   Name Primary?    Sore throat Yes    Flu-like symptoms        Orders Placed This Encounter   Procedures    Strep A Assay W/Optic    SARS-CoV-2/Flu A and B/RSV by PCR (Anika)       Meds & Refills for this Visit:  Requested Prescriptions      No prescriptions requested or ordered in this encounter       Imaging & Consults:  None     Testing as above.  Comfort measures explained.   Reviewed quarantine guidelines.  Advised that in the [facility] it is difficult to pick apart whether illness is an acute onset x 6 days or an exacerbation of her chronic cough that needs more involved work-up. Will perform viral testing and monitor closely.  If testing is neg, pt is advised to see pcp for further evaluation of her long-term cough symptoms.     Follow up with PCP in 3-5 days if not improving, condition worsens, or fever greater than or equal to 100.4 persists for 72 hours.    Verbalized understanding.    Patient Instructions   Your testing will be back in 24-36 hours.    Use OTC meds for comfort as needed--  Ibuprofen/Tylenol for fever/pain  Use Benadryl at bedtime to reduce drainage and promote rest.  Zyrtec/Claritin/Allegra in the AM to  reduce nasal drainage without sedation.   Use saline nasal sprays to reduce congestion and thin secretions.   Use Delsym for cough.   Consider applying ivory's vapo-rub or eucayptus oil to chest and feet at bedtime to reduce chest and nasal congestion.   Warm tea with honey, cough lozenges, vaporizers/steam etc.    If your testing is negative follow-up with your PCP for further evaluation of your long term cough and recent worsening symptoms.

## 2025-03-10 LAB
FLUAV + FLUBV RNA SPEC NAA+PROBE: NOT DETECTED
FLUAV + FLUBV RNA SPEC NAA+PROBE: NOT DETECTED
RSV RNA SPEC NAA+PROBE: NOT DETECTED
SARS-COV-2 RNA RESP QL NAA+PROBE: NOT DETECTED

## (undated) DEVICE — STERIS KITS

## (undated) DEVICE — GLOVE SUR 7 SENSICARE PIP WHT PWD F

## (undated) DEVICE — FIRSTSTEP 200ML READY2USE GEMI

## (undated) DEVICE — SINGLE USE DISTAL COVER MAJ-2315: Brand: SINGLE USE DISTAL COVER

## (undated) DEVICE — KIT VLV 5 PC AIR H2O SUCT BX ENDOGATOR CONN

## (undated) DEVICE — BITEBLOCK ENDOSCP 60FR MAXI STRP

## (undated) DEVICE — 3M™ RED DOT™ MONITORING ELECTRODE WITH FOAM TAPE AND STICKY GEL, 50/BAG, 20/CASE, 72/PLT 2570: Brand: RED DOT™

## (undated) DEVICE — 10FT COMBINED O2 DELIVERY/CO2 MONITORING. FILTER WITH MICROSTREAM TYPE LUER: Brand: DUAL ADULT NASAL CANNULA

## (undated) DEVICE — BALLOON HEMOSTATIC EUS LINEAR

## (undated) DEVICE — ECHOTIP PROCORE, HD ULTRASOUND BIOPSY NEEDLE: Brand: ECHOTIP PROCORE

## (undated) DEVICE — SNAPLOC WIRE GUIDE LOCKING DEVICE: Brand: SNAPLOC

## (undated) DEVICE — 1200CC GUARDIAN II: Brand: GUARDIAN

## (undated) DEVICE — GIJAW SINGLE-USE BIOPSY FORCEPS WITH NEEDLE: Brand: GIJAW

## (undated) DEVICE — OMNIPAQUE 300 MG/ML BTL

## (undated) NOTE — ED AVS SNAPSHOT
Heather Friend   MRN: [id number]    Department:  [facility] Emergency Department   Date of Visit:  12/16/2017           Disclosure     Insurance plans vary and the physician(s) referred by the ER may not be covered by your plan.  Please contact yo tell this physician (or your personal doctor if your instructions are to return to your personal doctor) about any new or lasting problems. The primary care or specialist physician will see patients referred from the [facility] Emergency Department.  Sol Hunter

## (undated) NOTE — ED AVS SNAPSHOT
Gee Caceres   MRN: [id number]    Department:  [facility] Emergency Department in [place]   Date of Visit:  1/25/2019           Disclosure     Insurance plans vary and the physician(s) referred by the ER may not be covered by your plan.  Please contac tell this physician (or your personal doctor if your instructions are to return to your personal doctor) about any new or lasting problems. The primary care or specialist physician will see patients referred from the [facility] Emergency Department.  Cheryle Levins

## (undated) NOTE — ED AVS SNAPSHOT
Cristian Mckoy   MRN: [id number]    Department:  [facility] in [place]   Date of Visit:  1/7/2020           Disclosure     Insurance plans vary and the physician(s) referred by the ER may not be covered by your plan.  Please contact tell this physician (or your personal doctor if your instructions are to return to your personal doctor) about any new or lasting problems. The primary care or specialist physician will see patients referred from the [facility] Emergency Department.  Shelton Lombard